# Patient Record
Sex: MALE | Race: WHITE | NOT HISPANIC OR LATINO | URBAN - METROPOLITAN AREA
[De-identification: names, ages, dates, MRNs, and addresses within clinical notes are randomized per-mention and may not be internally consistent; named-entity substitution may affect disease eponyms.]

---

## 2022-01-19 ENCOUNTER — INPATIENT (INPATIENT)
Facility: HOSPITAL | Age: 42
LOS: 0 days | Discharge: HOME | End: 2022-01-20
Attending: HOSPITALIST | Admitting: HOSPITALIST
Payer: COMMERCIAL

## 2022-01-19 VITALS
OXYGEN SATURATION: 100 % | DIASTOLIC BLOOD PRESSURE: 90 MMHG | RESPIRATION RATE: 20 BRPM | TEMPERATURE: 98 F | SYSTOLIC BLOOD PRESSURE: 161 MMHG | HEART RATE: 97 BPM

## 2022-01-19 LAB
ALBUMIN SERPL ELPH-MCNC: 4.5 G/DL — SIGNIFICANT CHANGE UP (ref 3.5–5.2)
ALP SERPL-CCNC: 92 U/L — SIGNIFICANT CHANGE UP (ref 30–115)
ALT FLD-CCNC: 55 U/L — HIGH (ref 0–41)
ANION GAP SERPL CALC-SCNC: 16 MMOL/L — HIGH (ref 7–14)
AST SERPL-CCNC: 28 U/L — SIGNIFICANT CHANGE UP (ref 0–41)
BASOPHILS # BLD AUTO: 0.05 K/UL — SIGNIFICANT CHANGE UP (ref 0–0.2)
BASOPHILS NFR BLD AUTO: 0.7 % — SIGNIFICANT CHANGE UP (ref 0–1)
BILIRUB SERPL-MCNC: 0.3 MG/DL — SIGNIFICANT CHANGE UP (ref 0.2–1.2)
BUN SERPL-MCNC: 15 MG/DL — SIGNIFICANT CHANGE UP (ref 10–20)
CALCIUM SERPL-MCNC: 9.4 MG/DL — SIGNIFICANT CHANGE UP (ref 8.5–10.1)
CHLORIDE SERPL-SCNC: 101 MMOL/L — SIGNIFICANT CHANGE UP (ref 98–110)
CO2 SERPL-SCNC: 21 MMOL/L — SIGNIFICANT CHANGE UP (ref 17–32)
CREAT SERPL-MCNC: 0.9 MG/DL — SIGNIFICANT CHANGE UP (ref 0.7–1.5)
EOSINOPHIL # BLD AUTO: 0.13 K/UL — SIGNIFICANT CHANGE UP (ref 0–0.7)
EOSINOPHIL NFR BLD AUTO: 1.8 % — SIGNIFICANT CHANGE UP (ref 0–8)
GLUCOSE SERPL-MCNC: 89 MG/DL — SIGNIFICANT CHANGE UP (ref 70–99)
HCT VFR BLD CALC: 46.2 % — SIGNIFICANT CHANGE UP (ref 42–52)
HGB BLD-MCNC: 15.8 G/DL — SIGNIFICANT CHANGE UP (ref 14–18)
IMM GRANULOCYTES NFR BLD AUTO: 1.1 % — HIGH (ref 0.1–0.3)
LYMPHOCYTES # BLD AUTO: 2.12 K/UL — SIGNIFICANT CHANGE UP (ref 1.2–3.4)
LYMPHOCYTES # BLD AUTO: 29.2 % — SIGNIFICANT CHANGE UP (ref 20.5–51.1)
MCHC RBC-ENTMCNC: 30.6 PG — SIGNIFICANT CHANGE UP (ref 27–31)
MCHC RBC-ENTMCNC: 34.2 G/DL — SIGNIFICANT CHANGE UP (ref 32–37)
MCV RBC AUTO: 89.5 FL — SIGNIFICANT CHANGE UP (ref 80–94)
MONOCYTES # BLD AUTO: 0.66 K/UL — HIGH (ref 0.1–0.6)
MONOCYTES NFR BLD AUTO: 9.1 % — SIGNIFICANT CHANGE UP (ref 1.7–9.3)
NEUTROPHILS # BLD AUTO: 4.23 K/UL — SIGNIFICANT CHANGE UP (ref 1.4–6.5)
NEUTROPHILS NFR BLD AUTO: 58.1 % — SIGNIFICANT CHANGE UP (ref 42.2–75.2)
NRBC # BLD: 0 /100 WBCS — SIGNIFICANT CHANGE UP (ref 0–0)
PLATELET # BLD AUTO: 270 K/UL — SIGNIFICANT CHANGE UP (ref 130–400)
POTASSIUM SERPL-MCNC: 4.2 MMOL/L — SIGNIFICANT CHANGE UP (ref 3.5–5)
POTASSIUM SERPL-SCNC: 4.2 MMOL/L — SIGNIFICANT CHANGE UP (ref 3.5–5)
PROT SERPL-MCNC: 7.5 G/DL — SIGNIFICANT CHANGE UP (ref 6–8)
RBC # BLD: 5.16 M/UL — SIGNIFICANT CHANGE UP (ref 4.7–6.1)
RBC # FLD: 12 % — SIGNIFICANT CHANGE UP (ref 11.5–14.5)
SARS-COV-2 RNA SPEC QL NAA+PROBE: SIGNIFICANT CHANGE UP
SODIUM SERPL-SCNC: 138 MMOL/L — SIGNIFICANT CHANGE UP (ref 135–146)
WBC # BLD: 7.27 K/UL — SIGNIFICANT CHANGE UP (ref 4.8–10.8)
WBC # FLD AUTO: 7.27 K/UL — SIGNIFICANT CHANGE UP (ref 4.8–10.8)

## 2022-01-19 PROCEDURE — 99285 EMERGENCY DEPT VISIT HI MDM: CPT

## 2022-01-19 PROCEDURE — 99243 OFF/OP CNSLTJ NEW/EST LOW 30: CPT

## 2022-01-19 RX ORDER — SODIUM CHLORIDE 9 MG/ML
1000 INJECTION, SOLUTION INTRAVENOUS ONCE
Refills: 0 | Status: COMPLETED | OUTPATIENT
Start: 2022-01-19 | End: 2022-01-19

## 2022-01-19 RX ORDER — KETOROLAC TROMETHAMINE 30 MG/ML
30 SYRINGE (ML) INJECTION ONCE
Refills: 0 | Status: DISCONTINUED | OUTPATIENT
Start: 2022-01-19 | End: 2022-01-19

## 2022-01-19 RX ADMIN — Medication 30 MILLIGRAM(S): at 21:56

## 2022-01-19 RX ADMIN — Medication 30 MILLIGRAM(S): at 22:37

## 2022-01-19 RX ADMIN — SODIUM CHLORIDE 1000 MILLILITER(S): 9 INJECTION, SOLUTION INTRAVENOUS at 21:56

## 2022-01-19 NOTE — H&P ADULT - NSHPPHYSICALEXAM_GEN_ALL_CORE
PHYSICAL EXAM:    General: Not in distress.   Skin: Erythematous maculopapular rash on b/l palms and soles, with no blisters or open sores. No swelling, drainage or active bleeding. Mouth: mildly raised sores, healed Patient agrees sores healed. Buttocks: healed wound to the bilateral buttocks.    Cardio: Regular rate and rhythm. S1, S2 appreciated. no murmurs.  Pulm: Bilateral breath sounds. No wheezing, or rhonchi  Abdomen: Soft, non-tender, non-distended.   Extremities: No edema  Neuro: AAOx3

## 2022-01-19 NOTE — H&P ADULT - ASSESSMENT
40 y/o M with no significant PMHx who presents to the ED with a chief complaint of rash to the b/l palms and soles and sores to mouth.     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 Blistering mouth sores  - evaluated by burn, no intervention  - c/w mupirocin  - c/w prednisone 40 mg daily  - c/w benadryl 25 mg PRN  - reconsult burn if rash continues to develop or open up  - Derm consult    # Misc  DVT ppx: lovenox  GI ppx: protonix  Diet: Regular  Activity: IAT  Code status: Full  Dispo: admit to medicine 40 y/o M with no significant PMHx who presents to the ED with a chief complaint of rash to the b/l palms and soles and sores to mouth.     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)  hemodynamically stable  skin intact, no evid for TENS  only lips involved (mouth, nares, eyes and penis not involved) - there was perianal involvement, but this is subsiding  evaluated by burn, no intervention  mupirocin not needed, unless skin ulcerates (so far, skin intact)  abx not needed for this (or for diverticular dz)  no cyclosporine at this juncture  make prednisone 70 mg (0.5mg/kg) daily for 1 week (can stop around 5-7 days w/out taper) (steroids seem to be helping him)  make benadryl 50 mg po q6 PRN  can use tylenol or advil as pain adjunct prn OTC  pt can f/u w/ PMD as outpt  Burn f/u not needed unless skin ulcerates  if rash not clearing well after 1wk or so, then derm consult    # I offered pt testing (for completion sake) for HIV, RPR, HSV, HEP A,B,C, but he refused  pt is  to woman for 20 yrs;     # DVT ppx: lovenox    # Activity: Independent    # Code status: Full    Dispo: d/c home today

## 2022-01-19 NOTE — ED PROVIDER NOTE - PHYSICAL EXAMINATION
Physical Exam    Vital Signs: I have reviewed the initial vital signs.  Constitutional: well-nourished, appears stated age, no acute distress  Eyes: Conjunctiva pink, Sclera clear, PERRLA, EOMI without pain.  ENT: pt has ulcerations to the inner lower buccal region.  uvula midline. no tonsillar erythema, edema, or exudates. no stridor. no pta. floor of the mouth is soft without pus.   Cardiovascular: S1 and S2, regular rate, regular rhythm, well-perfused extremities, radial pulses equal and 2+ b/l.   Respiratory: unlabored respiratory effort, clear to auscultation bilaterally no wheezing, rales and rhonchi. pt is speaking full sentences. no accessory muscle use.   Gastrointestinal: soft, non-tender, nondistended abdomen, no pulsatile mass, normal bowl sounds, no rebound, no guarding  Genitourinary: exam chaperoned by Dr. Hernandez. no testicular erythema, edema, or tenderness. no sloughing of the skin in the genital region.   Musculoskeletal: supple neck, no lower extremity edema, no calf tenderness, FROM of b/l upper and lower extremities.   Integumentary: warm, dry, (+) b/l erythema to the b/l palms and soles with erythematous flat macules to the dorsal aspect of b/l hands and feel. (+) pt has a erythema to the inner buttocks b/l with a left inner buttocks healing ulceration without drainage.   Neurologic: awake, alert, cranial nerves II-XII grossly intact, extremities’ motor and sensory functions grossly intact. steady gait.   Psychiatric: appropriate mood, appropriate affect

## 2022-01-19 NOTE — CONSULT NOTE ADULT - ASSESSMENT
Assessment: 41y old Male with no pmhx who presented with a chief complaint of rash to the b/l palms and soles and sores to mouth. Burn consulted for evaluation.    Plan:   -No intervention at this time. No open sores. Continue mupirocin as needed.   - If rash continues to develop, or open up, recall burn for skin biopsy. 41y old Male with no pmhx who presented with a chief complaint of rash to the b/l palms and soles and sores to mouth. Burn consulted for evaluation.    Palm and sole rash with mucosal involvement: r/o SJS  - No surgical intervention at this time. No open sores can continue mupirocin as needed.   - Patient recommended to stay for a few more hours for observation to see progression of rash. Patient agreed with plan.   - Patient educated on warning signs and symptoms.   - Upon dispo patient will followup with Dermatology outpatient   - If rash open up, recall for skin biopsy.

## 2022-01-19 NOTE — ED PROVIDER NOTE - OBJECTIVE STATEMENT
42 y/o male with no significant PMH presents to the ED for evaluation of mouth sores x 1 week, associated with burning itching rash to the b/l hands and feet x 2 days. pt reports over a week ago he was diagnosed with diverticulitis and started on bactrim and flagyl. pt reports he completed both abx. pt reports while he was taking the abx he developed mouth sore and throat tightness. pt was seen at [facility] and prescibed prednisone for the throat tightness which resolved. pt reports b/l hand and feet rash developed two days ago. pt was seen by pcp dr. florentino and prescribed valtrex for mouth sores. pt reports testicular burning and feels his testicles are raw. pt denies fever, chills, sore throat, chest pain, sob, weakness, or hx of allergies.

## 2022-01-19 NOTE — CONSULT NOTE ADULT - ATTENDING COMMENTS
As above   Chart reviewed. History of illness reviewed with pt and wife   Pt developed parasthesias of hands and feet , rash and oral lesions after beginning Bactrim and Flagyl po for diverticulitis.   Has had antiviral and steroids to treat sxs;  abx discontinued  Denies fever, eye sxs or genital sxs  Reports tightness and parasthesias of hand improved and tongue and lip swelling seem resolved today                         15.8   7.27  )-----------( 270      ( 19 Jan 2022 22:05 )             46.2     EXAM:  Pt awake alert and with appropriate responses to questions   Eyes - sclera clear   Oral - ulcers right commissure, labial and buccal mucosa - no bleeding   b/l hands and feet -dorsum - erythematous maculopapular rash ; no blistering, weeping or open wounds   erythema and sl tenderness/ sensitivity to touch  - palmar and plantar surfaces     erythema of upper chest and back - no change from pt's usual skin condition     A/P -  Pt with rash - likely due to Bactrim   signs and sxs are c/w with SJS though seem to have stabilized   Discussed continued monitoring/ management and skin biopsy as possibly diagnostic tool with pt and wife   They opt to hold off on skin biopsy for now   Concerns addressed   Team discussed above with medical attending

## 2022-01-19 NOTE — ED ADULT NURSE NOTE - TEMPLATE LIST FOR HEAD TO TOE ASSESSMENT
Call to patient's wife, updated as to completion of infusion and ECHO   Physician will contact with results General

## 2022-01-19 NOTE — ED ADULT NURSE NOTE - OBJECTIVE STATEMENT
Pt with C/O B/L hand and feet pins and needless skin redness swollen, mouth sore rash throat pain  after was given Flagyl, ,Sulfa antibiotic for DX- Diverticulitis ,pt state he took antibiotics  for 6 days went to [facility]  and was given Benadryl and prednisone or 4 days ,pt still complaining still symptoms on going  not going way .

## 2022-01-19 NOTE — CONSULT NOTE ADULT - SUBJECTIVE AND OBJECTIVE BOX
HPI: Patient is a 41y old Male with no pmhx who presents with a chief complaint of rash to the b/l palms and soles and sores to mouth. Patient states sores to his mouth presented about 1 week ago, from when patient was started on Bactrim and flagyl, after being diagnosed with diverticulitis in the ED 1 week ago. Patient states he initially felt a rash on his buttocks and throat soreness. He went to the Urgent care, where he was treated with prednisone. Rash to b/l palms and soles presented two days ago. Patient states he feels pain and tingling to affected areas, and the rash progressively spreading. He self treated mouth sores with oral B mouthwash and rash with neosporin ointment. Yesterday, he went to his PCP where he was treated with valcyclovir and rash with mupirocin.     Burn was consulted for evaluation of rash and sores.     PAST MEDICAL & SURGICAL HISTORY:    Allergies    penicillin (Unknown)      PHYSICAL EXAM    Vital Signs Last 24 Hrs  T(C): 36.8 (19 Jan 2022 18:44), Max: 36.8 (19 Jan 2022 18:44)  T(F): 98.2 (19 Jan 2022 18:44), Max: 98.2 (19 Jan 2022 18:44)  HR: 97 (19 Jan 2022 18:44) (97 - 97)  RR: 20 (19 Jan 2022 18:44) (20 - 20)  SpO2: 100% (19 Jan 2022 18:44) (100% - 100%)      PHYSICAL EXAM:  GENERAL: Sitting on exam table, in NAD.   Wound: Erythematous maculopapular rash on b/l palms and soles, with no blisters or open sores. No swelling, drainage or active bleeding. Mouth: mildly raised sores, healed Patient agrees sores healed. Buttocks: healed wound to the bilateral buttocks.           HPI: Patient is a 41y old Male with no pmhx who presents with a chief complaint of rash to the b/l palms and soles and sores to mouth. Patient states sores to his mouth presented about 1 week ago, from when patient was started on Bactrim and flagyl, after being diagnosed with diverticulitis in the ED 1 week ago. Patient states he initially felt a rash on his buttocks and throat soreness. He went to the Urgent care, where he was treated with prednisone. Rash to b/l palms and soles presented two days ago. Patient states he feels pain and tingling to affected areas, and the rash progressively spreading. He self treated mouth sores with oral B mouthwash and rash with neosporin ointment. Yesterday, he went to his PCP where he was treated with valcyclovir and rash with mupirocin.     Burn was consulted for evaluation of rash and sores.     PAST MEDICAL & SURGICAL HISTORY:    Allergies  - Penicillin (Unknown)    Vital Signs Last 24 Hrs  T(C): 36.8 (19 Jan 2022 18:44), Max: 36.8 (19 Jan 2022 18:44)  T(F): 98.2 (19 Jan 2022 18:44), Max: 98.2 (19 Jan 2022 18:44)  HR: 97 (19 Jan 2022 18:44) (97 - 97)  RR: 20 (19 Jan 2022 18:44) (20 - 20)  SpO2: 100% (19 Jan 2022 18:44) (100% - 100%)    PHYSICAL EXAM:  GENERAL: Sitting on exam table, in NAD.   HEENT: multiple white non-open lesions on buccal mucosa. No active bleeding or drainage noted.   Wound: Erythematous maculopapular rash on b/l palms and soles, with no blisters or open sores. No swelling, drainage or active bleeding. Mouth: mildly raised sores, healed Patient agrees sores healed. Buttocks: healed wound to the bilateral buttocks.

## 2022-01-19 NOTE — ED PROVIDER NOTE - NS ED ROS FT
CONST: No fever, chills or bodyaches  EYES: No pain, redness, drainage or visual changes.  ENT: No ear pain or discharge, nasal discharge or congestion. No sore throat. (+) mouth sores.   CARD: No chest pain, palpitations  RESP: No SOB, cough, hemoptysis. No hx of asthma or COPD  GI: No abdominal pain, N/V/D  : No urinary symptoms  MS: No joint pain, back pain or extremity pain/injury  SKIN: (+) b/l hand and feet rash.   NEURO: No headache, dizziness, paresthesias or LOC

## 2022-01-19 NOTE — ED PROVIDER NOTE - ATTENDING CONTRIBUTION TO CARE
40 y/o M with no significant PMHx who presents to the ED with a chief complaint of rash to the b/l palms and soles and sores to mouth.  Pt had diverticulitis a week ago for which he was started on Bactrim and Flagyl. Pt then developed painful blisters on his buttocks, throat soreness, and his tongue and lips became swollen.  He visited a local C and was prescribed steroids for 4 days. Symptoms improved with steroids. Pt continued to take Abx for 6 days despite rash and stopped it when he developed painful mouth sores. He self medicated with mouthwash.  About 2 days ago, he developed rash on b/l palms and soles, rash gives a tingling sensation, he used neosporin ointment without relief.  He visited his PCP yesterday and was prescribed valacyclovir and mupirocin.  His pain has worsened to ED which prompted ED visit. He denies any shortness of breath, dizziness, nausea, vomiting.  PE:  agree with above.  A/P:  Erythema Multiforme/Valdez Hamzah Syndrome.  Labs, Meds, Imaging, Burn Consult.  IVF and ADMIT.

## 2022-01-19 NOTE — H&P ADULT - HISTORY OF PRESENT ILLNESS
40 y/o M with no significant PMH presents to the ED for evaluation of rash and mouth sores x 1 week.  associated with burning itching rash to the b/l hands and feet x 2 days. pt reports over a week ago he was diagnosed with diverticulitis and started on bactrim and flagyl. pt reports he completed both abx. pt reports while he was taking the abx he developed mouth sore and throat tightness. pt was seen at [facility] and prescibed prednisone for the throat tightness which resolved. pt reports b/l hand and feet rash developed two days ago. pt was seen by pcp dr. florentino and prescribed valtrex for mouth sores. pt reports testicular burning and feels his testicles are raw. pt denies fever, chills, sore throat, chest pain, sob, weakness, or hx of allergies.   42 y/o M with no significant PMHx who presents to the ED with a chief complaint of rash to the b/l palms and soles and sores to mouth.   Pt had diverticulitis a week ago for which he was started on bactrim and flagyl. Pt then developed painful blisters on his buttocks, he felt throat soreness, his tongue and lips became swollen, he visited  and was prescribed steroids for 4 days. Symptoms improved with steroids. Pt continued to take Abx for 6 days despite rash and stopped it when he developed painful mouth sores. He self medicated with mouthwash.  About 2 days ago, he developed rash on b/l palms and soles, rash gives a tingling sensation, he used neosporin ointment without relief.  He visited his PCP yesterday and was prescribed valacyclovir and mupirocin.  His pain has worsened to ED which prompted ED visit. He denies any shortness of breath, dizziness, nausea, vomiting.      In the ED, /90, HR 97, temp 98.2 and spO2 100% on RA.      40 y/o M with no significant PMHx who presents to the ED with a chief complaint of rash to the b/l palms and soles and sores to mouth.   Pt had diverticulitis a week ago for which he was started on bactrim and flagyl. Pt then developed painful blisters on his buttocks, he felt throat soreness, his tongue and lips became swollen, he visited  and was prescribed steroids for 4 days. Symptoms improved with steroids. Pt continued to take Abx for 6 days despite rash and stopped it when he developed painful mouth sores. He self medicated with mouthwash.  About 2 days ago, he developed rash on b/l palms and soles, rash gives a tingling sensation, he used neosporin ointment without relief.  He visited his PCP yesterday and was prescribed valacyclovir and mupirocin.  His pain has worsened to ED which prompted ED visit. He denies any shortness of breath, dizziness, nausea, vomiting.    In the ED, /90, HR 97, temp 98.2 and spO2 100% on RA.     Attd: seems pt has sensitive skin to start w/ as he has blotchy rash on chest and back for yrs (prob exczema, does NOT look like psoriasis); this rash has never had symptoms (no pain or itch); pt was placed on bactrim/flagyl recently for diverticulitis; he developed painful ulcers around anus and eventually lips; he also has swelling of hands and feet and blotchy erthema on dorsum of hands; no hives seen; hands seem swollen and are a little difficult for him to close; hands and feet were itchy and a little tender; pt had taper of steroids, but this failed w/ tapering; pt feeling better w/ solumedrol and wants to go home today; pt can swallow; he has no penile lesions; all skin is intact; no target lesions seen; lips show shallow ulcers, no vesicles; there is no necrosis; nares and eyes are spared;

## 2022-01-20 VITALS
TEMPERATURE: 98 F | DIASTOLIC BLOOD PRESSURE: 67 MMHG | SYSTOLIC BLOOD PRESSURE: 147 MMHG | HEART RATE: 104 BPM | RESPIRATION RATE: 18 BRPM

## 2022-01-20 DIAGNOSIS — K57.92 DIVERTICULITIS OF INTESTINE, PART UNSPECIFIED, WITHOUT PERFORATION OR ABSCESS WITHOUT BLEEDING: ICD-10-CM

## 2022-01-20 PROCEDURE — 99223 1ST HOSP IP/OBS HIGH 75: CPT

## 2022-01-20 RX ORDER — DIPHENHYDRAMINE HCL 50 MG
50 CAPSULE ORAL EVERY 6 HOURS
Refills: 0 | Status: DISCONTINUED | OUTPATIENT
Start: 2022-01-20 | End: 2022-01-20

## 2022-01-20 RX ORDER — DIPHENHYDRAMINE HYDROCHLORIDE AND LIDOCAINE HYDROCHLORIDE AND ALUMINUM HYDROXIDE AND MAGNESIUM HYDRO
15 KIT THREE TIMES A DAY
Refills: 0 | Status: DISCONTINUED | OUTPATIENT
Start: 2022-01-20 | End: 2022-01-20

## 2022-01-20 RX ORDER — DIPHENHYDRAMINE HCL 50 MG
1 CAPSULE ORAL
Qty: 28 | Refills: 0
Start: 2022-01-20 | End: 2022-01-26

## 2022-01-20 RX ORDER — MUPIROCIN 20 MG/G
1 OINTMENT TOPICAL EVERY 8 HOURS
Refills: 0 | Status: DISCONTINUED | OUTPATIENT
Start: 2022-01-20 | End: 2022-01-20

## 2022-01-20 RX ORDER — PANTOPRAZOLE SODIUM 20 MG/1
40 TABLET, DELAYED RELEASE ORAL
Refills: 0 | Status: DISCONTINUED | OUTPATIENT
Start: 2022-01-20 | End: 2022-01-20

## 2022-01-20 RX ORDER — CHLORHEXIDINE GLUCONATE 213 G/1000ML
1 SOLUTION TOPICAL DAILY
Refills: 0 | Status: DISCONTINUED | OUTPATIENT
Start: 2022-01-20 | End: 2022-01-20

## 2022-01-20 RX ORDER — INFLUENZA VIRUS VACCINE 15; 15; 15; 15 UG/.5ML; UG/.5ML; UG/.5ML; UG/.5ML
0.5 SUSPENSION INTRAMUSCULAR ONCE
Refills: 0 | Status: COMPLETED | OUTPATIENT
Start: 2022-01-20 | End: 2022-01-20

## 2022-01-20 RX ORDER — ENOXAPARIN SODIUM 100 MG/ML
40 INJECTION SUBCUTANEOUS DAILY
Refills: 0 | Status: DISCONTINUED | OUTPATIENT
Start: 2022-01-20 | End: 2022-01-20

## 2022-01-20 RX ADMIN — Medication 50 MILLIGRAM(S): at 00:59

## 2022-01-20 RX ADMIN — Medication 60 MILLIGRAM(S): at 05:35

## 2022-01-20 RX ADMIN — PANTOPRAZOLE SODIUM 40 MILLIGRAM(S): 20 TABLET, DELAYED RELEASE ORAL at 05:35

## 2022-01-20 RX ADMIN — DIPHENHYDRAMINE HYDROCHLORIDE AND LIDOCAINE HYDROCHLORIDE AND ALUMINUM HYDROXIDE AND MAGNESIUM HYDRO 15 MILLILITER(S): KIT at 05:34

## 2022-01-20 RX ADMIN — MUPIROCIN 1 APPLICATION(S): 20 OINTMENT TOPICAL at 05:35

## 2022-01-20 NOTE — DISCHARGE NOTE PROVIDER - NSDCCPCAREPLAN_GEN_ALL_CORE_FT
PRINCIPAL DISCHARGE DIAGNOSIS  Diagnosis: Erythema multiforme bullosum (Valdez Hamzah syndrome)  Assessment and Plan of Treatment:        PRINCIPAL DISCHARGE DIAGNOSIS  Diagnosis: Erythema multiforme bullosum (Valdez Hamzah syndrome)  Assessment and Plan of Treatment: You recently use the antibiotic bactrim for a diverticulitis flare. While using bactrim you developed skin and oral rash. You are diagnosed with Justin Hamzah Syndrome.  Please follow up with Dr. Gill within 1 week.  If your rash, oral lesions worsen please return to the ED.  If your start to experience sluffing of your skin return to the ED.  Please continue to take prednisone 70mg daily for 7 days. If your rash is improving but not fully resolved contact your doctor about continuing steroids for several more days.  Please avoid the use of bactrim as well as be cautious when taken future medications with sulfur as a component.  WHAT YOU NEED TO KNOW:  Valdez-Hamzah syndrome (SJS) is a rare and serious condition of your skin and mucus membranes. SJS will cause you to lose up to 10% of your outer layer of skin. SJS is usually caused by a response to a medicine you have been taking. The most common medicines are antibiotics, NSAIDs, and antiseizure medicines. The response may happen 1 week to 2 months after you take the medicine. SJS may also be caused by infection, vaccinations, or diseases involving your organs or whole body.  DISCHARGE INSTRUCTIONS:  Alert others of your condition:   •Carry medical alert identification, such as jewelry or a card that says you have had SJS. In case of emergency, this will tell others which medicines have made you sick. Ask your healthcare provider where to get these items.   •Tell close relatives what caused your SJS. They may be at a higher risk for SJS from the same cause.  Contact your healthcare provider if:   •You have a fever.  •You have questions or concerns about your condition or care.  Seek immediate care or call 911 if:   •You feel very dizzy, or you faint.  •You have new changes in your vision, or you suddenly have trouble seeing.  •You have new sores on your body.  •You have pain that is not helped by medicine, or is getting worse.  •You suddenly have trouble breathing.

## 2022-01-20 NOTE — PATIENT PROFILE ADULT - FALL HARM RISK - UNIVERSAL INTERVENTIONS
Bed in lowest position, wheels locked, appropriate side rails in place/Call bell, personal items and telephone in reach/Instruct patient to call for assistance before getting out of bed or chair/Non-slip footwear when patient is out of bed/[place] to call system/Physically safe environment - no spills, clutter or unnecessary equipment/Purposeful Proactive Rounding/Room/bathroom lighting operational, light cord in reach

## 2022-01-20 NOTE — PROGRESS NOTE ADULT - SUBJECTIVE AND OBJECTIVE BOX
SUBJECTIVE:    Patient is a 41y old Male who presents with a chief complaint of   Currently admitted to medicine with the primary diagnosis of Erythema multiforme bullosum (Valdez Hamzah syndrome)       Today is hospital day 1d. This morning he is resting comfortably in bed and reports no new issues or overnight events. Pt reports stable B/L hand and sole erythematous and edematous erythema.       PAST MEDICAL & SURGICAL HISTORY    SOCIAL HISTORY:    ALLERGIES:  penicillin (Unknown)    MEDICATIONS:  STANDING MEDICATIONS  chlorhexidine 4% Liquid 1 Application(s) Topical daily  enoxaparin Injectable 40 milliGRAM(s) SubCutaneous daily  FIRST- Mouthwash  BLM 15 milliLiter(s) Swish and Spit three times a day  influenza   Vaccine 0.5 milliLiter(s) IntraMuscular once  methylPREDNISolone sodium succinate Injectable 60 milliGRAM(s) IV Push daily  mupirocin 2% Ointment 1 Application(s) Topical every 8 hours  pantoprazole    Tablet 40 milliGRAM(s) Oral before breakfast    PRN MEDICATIONS  diphenhydrAMINE 50 milliGRAM(s) Oral every 6 hours PRN    VITALS:   T(F): 97  HR: 80  BP: 112/66  RR: 18  SpO2: 96%    LABS:  Negative for smoking/alcohol/drug use.                         15.8   7.27  )-----------( 270      ( 19 Jan 2022 22:05 )             46.2     01-19    138  |  101  |  15  ----------------------------<  89  4.2   |  21  |  0.9    Ca    9.4      19 Jan 2022 22:05    TPro  7.5  /  Alb  4.5  /  TBili  0.3  /  DBili  x   /  AST  28  /  ALT  55<H>  /  AlkPhos  92  01-19        RADIOLOGY:  No imaging this admission.       PHYSICAL EXAM:  GEN: No acute distress. Pt was seen and examined at bedside.   HEENT: + corn of lip healing ulcerated, erythematous lesions. + oral lesions of buccal mucosa and mucosa anterior to teeth, erythematous, healing ulcerative lesions. Facial erythema, appears coalesced raised lesions. Tongue slight erythema. Posterior pharnyx minimal erythema, no lesions noted. normocephalic, atraumatic, aniceteric  LUNGS: Clear to auscultation bilaterally, no rales/wheezing/ rhonchi  HEART: S1/S2 present. RRR, no murmurs  ABD: Soft, non-tender, non-distended. Bowel sounds present  EXT: No LE pitting edema, skin findings as below.   NEURO: Alert and awake, normal affect  SKIN:  B/L hand dorsum and palm erythematous coalesced lesions, noted to be stable since admission. B/L feet sole erythema and edema. No pruritis. Chest and upper arm maculopapular erythematous lesions noted to be stable and chronic per pt.       ASSESSMENT AND PLAN:  42 y/o M with PMHx of diverticulitis who presents to the ED with a chief complaint of rash to the b/l palms and soles and sores to mouth.     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 Blistering mouth sores  - evaluated by burn, no intervention  - c/w mupirocin  - c/w prednisone 40 mg daily  - c/w benadryl 25 mg PRN  - reconsult burn if rash continues to develop or open up  - Derm consult    # Misc  DVT ppx: lovenox  GI ppx: protonix  Diet: Regular  Activity: IAT  Code status: Full  Dispo: admit to medicine         SUBJECTIVE:    Patient is a 41y old Male who presents with a chief complaint of   Currently admitted to medicine with the primary diagnosis of Erythema multiforme bullosum (Valdez Hamzah syndrome)       Today is hospital day 1d. This morning he is resting comfortably in bed and reports no new issues or overnight events. Pt reports stable B/L hand and sole erythematous and edematous erythema.       PAST MEDICAL & SURGICAL HISTORY    SOCIAL HISTORY:    ALLERGIES:  penicillin (Unknown)    MEDICATIONS:  STANDING MEDICATIONS  chlorhexidine 4% Liquid 1 Application(s) Topical daily  enoxaparin Injectable 40 milliGRAM(s) SubCutaneous daily  FIRST- Mouthwash  BLM 15 milliLiter(s) Swish and Spit three times a day  influenza   Vaccine 0.5 milliLiter(s) IntraMuscular once  methylPREDNISolone sodium succinate Injectable 60 milliGRAM(s) IV Push daily  mupirocin 2% Ointment 1 Application(s) Topical every 8 hours  pantoprazole    Tablet 40 milliGRAM(s) Oral before breakfast    PRN MEDICATIONS  diphenhydrAMINE 50 milliGRAM(s) Oral every 6 hours PRN    VITALS:   T(F): 97  HR: 80  BP: 112/66  RR: 18  SpO2: 96%    LABS:  Negative for smoking/alcohol/drug use.                         15.8   7.27  )-----------( 270      ( 19 Jan 2022 22:05 )             46.2     01-19    138  |  101  |  15  ----------------------------<  89  4.2   |  21  |  0.9    Ca    9.4      19 Jan 2022 22:05    TPro  7.5  /  Alb  4.5  /  TBili  0.3  /  DBili  x   /  AST  28  /  ALT  55<H>  /  AlkPhos  92  01-19        RADIOLOGY:  No imaging this admission.       PHYSICAL EXAM:  GEN: No acute distress. Pt was seen and examined at bedside.   HEENT: + corn of lip healing ulcerated, erythematous lesions. + oral lesions of buccal mucosa and mucosa anterior to teeth, erythematous, healing ulcerative lesions. Facial erythema, appears coalesced raised lesions. Tongue slight erythema. Posterior pharnyx minimal erythema, no lesions noted. normocephalic, atraumatic, aniceteric  LUNGS: Clear to auscultation bilaterally, no rales/wheezing/ rhonchi  HEART: S1/S2 present. RRR, no murmurs  ABD: Soft, non-tender, non-distended. Bowel sounds present  EXT: No LE pitting edema, skin findings as below.   NEURO: Alert and awake, normal affect  SKIN:  B/L hand dorsum and palm erythematous coalesced lesions, noted to be stable since admission. B/L feet sole erythema and edema. No pruritis. Chest and upper arm maculopapular erythematous lesions noted to be stable and chronic per pt.       ASSESSMENT AND PLAN:  40 y/o M with PMHx of diverticulitis who presents to the ED with a chief complaint of rash to the b/l palms and soles and sores to mouth.       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 Blistering mouth sores  -Burn consulted:  no intervention, mouth wash/mupirocin --> if worsening rash or blistering/opening of lesions --> recall Burn    -f/u HIV, syphilis, HSV serum 1+2, hep panel  -inquire about sexual history and allergy history (pt and family)  -c/w mupirocin  -c/w methylprednisolone 60mg IV qd --> may switch to   -c/w benadryl 25 mg PRN  -reconsult burn if rash continues to develop or open up  -Derm consult    #Elevated ALT  -ALT: 55    -hepatitis panel    #h/o Diverticulitis, recent flare started on bactrim  -no diarrhea, abdominal pain, or constipation  -no leukocytosis    -monitor for sxs      # Misc  DVT ppx:  lovenox  GI ppx:  protonix 40mg PO qd  Diet:  Soft and bite sized  Activity:  IAT  Code status:  Full    Dispo: admit to medicine, possible discharged pending improvement in rash and no worsening of rash/oral lesions      Provider Handoff: (Pending) - follow up:   -f/u HIV, syphilis, HSV serum 1+2, hep panel  -f/u sexual history and allergy history

## 2022-01-20 NOTE — DISCHARGE NOTE PROVIDER - NSDCFUADDAPPT_GEN_ALL_CORE_FT
Please follow up with Dr. Gill within 1 week.  If your rash, oral lesions worsen please return to the ED.  If your start to experience sluffing of your skin return to the ED.    Please continue to take prednisone 70mg daily for 7 days. If your rash is improving but not fully resolved contact your doctor about continuing steroids for several more days.    Please follow up with your PCP regarding your HIV, hepatitis, HSV, and syphilis results.

## 2022-01-20 NOTE — DISCHARGE NOTE NURSING/CASE MANAGEMENT/SOCIAL WORK - PATIENT PORTAL LINK FT
You can access the FollowMyHealth Patient Portal offered by [facility] by registering at the following website: [email or web address]. By joining [employer]’s FollowMyHealth portal, you will also be able to view your health information using other applications (apps) compatible with our system.

## 2022-01-20 NOTE — DISCHARGE NOTE PROVIDER - CARE PROVIDER_API CALL
Kiel Carrillo (DO)  Infectious Disease; Internal Medicine  [address] [place] [address]  Phone: [phone]  Fax: [phone]  Established Patient  Follow Up Time: 1-3 days

## 2022-01-20 NOTE — DISCHARGE NOTE PROVIDER - NSDCMRMEDTOKEN_GEN_ALL_CORE_FT
diphenhydrAMINE 50 mg oral capsule: 1 cap(s) orally every 6 hours, As needed, Rash and/or Itching  predniSONE 20 mg oral tablet: 1 tab(s) orally once a day take with prednisone 50mg tab to ensure you are taking 70mg of prednisone daily.  predniSONE 50 mg oral tablet: 1 tab(s) orally once a day please take with the prednisone 20mg tab to ensure you take prednisone 70mg daily.

## 2022-01-20 NOTE — DISCHARGE NOTE PROVIDER - HOSPITAL COURSE
40 y/o M with PMHx of diverticulitis who presents to the ED with a chief complaint of rash to the b/l palms and soles and sores to mouth.   Pt had diverticulitis a week ago for which he was started on bactrim and flagyl. Pt then developed painful blisters on his buttocks, he felt throat soreness, his tongue and lips became swollen, he visited UC and was prescribed steroids for 4 days. Symptoms improved with steroids. Pt continued to take Abx for 6 days despite rash and stopped it when he developed painful mouth sores. He self medicated with mouthwash.  About 2 days ago, he developed rash on b/l palms and soles, rash gives a tingling sensation, he used neosporin ointment without relief.  He visited his PCP yesterday and was prescribed valacyclovir and mupirocin.  His pain has worsened to ED which prompted ED visit. He denies any shortness of breath, dizziness, nausea, vomiting.      In the ED, /90, HR 97, temp 98.2 and spO2 100% on RA.         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 Blistering mouth sores  -Burn consulted:  no intervention, mouth wash/mupirocin --> if worsening rash or blistering/opening of lesions --> recall Burn    -f/u HIV, syphilis, HSV serum 1+2, hep panel  -inquire about sexual history and allergy history (pt and family)  -c/w mupirocin  -c/w methylprednisolone 60mg IV qd --> may switch to   -c/w benadryl 25 mg PRN  -reconsult burn if rash continues to develop or open up  -Derm consult    #Elevated ALT  -ALT: 55    -hepatitis panel    #h/o Diverticulitis, recent flare started on bactrim  -no diarrhea, abdominal pain, or constipation  -no leukocytosis    -monitor for sxs      # Misc  DVT ppx:  lovenox  GI ppx:  protonix 40mg PO qd  Diet:  Soft and bite sized  Activity:  IAT  Code status:  Full    Dispo: admit to medicine, possible discharged pending improvement in rash and no worsening of rash/oral lesions      On discharged:  pt to be discharged on prednisone 70mg PO qd x 7 days, if pt still has rash --> continue prednisone;  Benadryl 50mg q6hr PRN x 7 days, may continue as needed. Pt declined hep, HIV, HSV 1+2, and syphilis workup, pt agree to follow up with primary doctor.     42 y/o M with PMHx of diverticulitis who presents to the ED with a chief complaint of rash to the b/l palms and soles and sores to mouth.   Pt had diverticulitis a week ago for which he was started on bactrim and flagyl. Pt then developed painful blisters on his buttocks, he felt throat soreness, his tongue and lips became swollen, he visited UC and was prescribed steroids for 4 days. Symptoms improved with steroids. Pt continued to take Abx for 6 days despite rash and stopped it when he developed painful mouth sores. He self medicated with mouthwash.  About 2 days ago, he developed rash on b/l palms and soles, rash gives a tingling sensation, he used neosporin ointment without relief.  He visited his PCP yesterday and was prescribed valacyclovir and mupirocin.  His pain has worsened to ED which prompted ED visit. He denies any shortness of breath, dizziness, nausea, vomiting.      In the ED, /90, HR 97, temp 98.2 and spO2 100% on RA.         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 Blistering mouth sores  -Burn consulted:  no intervention, mouth wash/mupirocin --> if worsening rash or blistering/opening of lesions --> recall Burn    -f/u HIV, syphilis, HSV serum 1+2, hep panel  -inquire about sexual history and allergy history (pt and family)  -c/w mupirocin  -c/w methylprednisolone 60mg IV qd --> on discharge pt to take prednisone 70mg qd x 7 days and to contact PCP if needed for further abx. If rash or oral lesions worsen pt instructed to return to ED.  -c/w benadryl 25 mg PRN --> on discharge benadryl 50mg PO q6hr PRN x 7 days, may extend for several more days if needed.  -reconsult burn if rash continues to develop or open up  -Derm consult    #Elevated ALT  -ALT: 55    -hepatitis panel    #h/o Diverticulitis, recent flare started on bactrim  -no diarrhea, abdominal pain, or constipation  -no leukocytosis    -monitor for sxs    Dispo: admit to medicine, possible discharged pending improvement in rash and no worsening of rash/oral lesions      On discharged:  pt to be discharged on prednisone 70mg PO qd x 7 days, if pt still has rash --> continue prednisone;  Benadryl 50mg q6hr PRN x 7 days, may continue as needed. hep, HIV, HSV 1+2, and syphilis workup sent and pt agree to follow up with primary doctor after discharge. Med rec reviewed and discussed with primary medical attending.

## 2022-01-21 LAB
HAV IGM SER-ACNC: SIGNIFICANT CHANGE UP
HBV CORE IGM SER-ACNC: SIGNIFICANT CHANGE UP
HBV SURFACE AG SER-ACNC: SIGNIFICANT CHANGE UP
HCV AB S/CO SERPL IA: 0.14 S/CO — SIGNIFICANT CHANGE UP (ref 0–0.99)
HCV AB SERPL-IMP: SIGNIFICANT CHANGE UP
HIV 1+2 AB+HIV1 P24 AG SERPL QL IA: SIGNIFICANT CHANGE UP
HSV1 IGG SER-ACNC: 45 INDEX — HIGH
HSV1 IGG SERPL QL IA: POSITIVE
HSV2 IGG FLD-ACNC: 21.9 INDEX — HIGH
HSV2 IGG SERPL QL IA: POSITIVE
T PALLIDUM AB TITR SER: NEGATIVE — SIGNIFICANT CHANGE UP

## 2022-01-25 DIAGNOSIS — T36.8X5A ADVERSE EFFECT OF OTHER SYSTEMIC ANTIBIOTICS, INITIAL ENCOUNTER: ICD-10-CM

## 2022-01-25 DIAGNOSIS — L49.0: ICD-10-CM

## 2022-01-25 DIAGNOSIS — R21 RASH AND OTHER NONSPECIFIC SKIN ERUPTION: ICD-10-CM

## 2022-01-25 DIAGNOSIS — K12.1 OTHER FORMS OF STOMATITIS: ICD-10-CM

## 2022-01-25 DIAGNOSIS — L51.1 STEVENS-JOHNSON SYNDROME: ICD-10-CM

## 2022-01-25 DIAGNOSIS — Z88.0 ALLERGY STATUS TO PENICILLIN: ICD-10-CM

## 2023-03-23 ENCOUNTER — EMERGENCY (EMERGENCY)
Facility: HOSPITAL | Age: 43
LOS: 0 days | Discharge: HOME | End: 2023-03-23
Attending: EMERGENCY MEDICINE
Payer: COMMERCIAL

## 2023-03-23 VITALS
DIASTOLIC BLOOD PRESSURE: 86 MMHG | TEMPERATURE: 97 F | SYSTOLIC BLOOD PRESSURE: 143 MMHG | OXYGEN SATURATION: 99 % | HEART RATE: 87 BPM | WEIGHT: 274.03 LBS | RESPIRATION RATE: 18 BRPM | HEIGHT: 73 IN

## 2023-03-23 VITALS
DIASTOLIC BLOOD PRESSURE: 80 MMHG | OXYGEN SATURATION: 100 % | RESPIRATION RATE: 16 BRPM | HEART RATE: 80 BPM | SYSTOLIC BLOOD PRESSURE: 132 MMHG

## 2023-03-23 DIAGNOSIS — Z88.2 ALLERGY STATUS TO SULFONAMIDES: ICD-10-CM

## 2023-03-23 DIAGNOSIS — R14.0 ABDOMINAL DISTENSION (GASEOUS): ICD-10-CM

## 2023-03-23 DIAGNOSIS — Z88.0 ALLERGY STATUS TO PENICILLIN: ICD-10-CM

## 2023-03-23 DIAGNOSIS — K57.92 DIVERTICULITIS OF INTESTINE, PART UNSPECIFIED, WITHOUT PERFORATION OR ABSCESS WITHOUT BLEEDING: ICD-10-CM

## 2023-03-23 DIAGNOSIS — R10.32 LEFT LOWER QUADRANT PAIN: ICD-10-CM

## 2023-03-23 LAB
ALBUMIN SERPL ELPH-MCNC: 4.2 G/DL — SIGNIFICANT CHANGE UP (ref 3.5–5.2)
ALP SERPL-CCNC: 85 U/L — SIGNIFICANT CHANGE UP (ref 30–115)
ALT FLD-CCNC: 20 U/L — SIGNIFICANT CHANGE UP (ref 0–41)
ANION GAP SERPL CALC-SCNC: 7 MMOL/L — SIGNIFICANT CHANGE UP (ref 7–14)
APPEARANCE UR: CLEAR — SIGNIFICANT CHANGE UP
AST SERPL-CCNC: 18 U/L — SIGNIFICANT CHANGE UP (ref 0–41)
BASOPHILS # BLD AUTO: 0.03 K/UL — SIGNIFICANT CHANGE UP (ref 0–0.2)
BASOPHILS NFR BLD AUTO: 0.3 % — SIGNIFICANT CHANGE UP (ref 0–1)
BILIRUB SERPL-MCNC: 0.5 MG/DL — SIGNIFICANT CHANGE UP (ref 0.2–1.2)
BILIRUB UR-MCNC: NEGATIVE — SIGNIFICANT CHANGE UP
BUN SERPL-MCNC: 13 MG/DL — SIGNIFICANT CHANGE UP (ref 10–20)
CALCIUM SERPL-MCNC: 8.9 MG/DL — SIGNIFICANT CHANGE UP (ref 8.4–10.5)
CHLORIDE SERPL-SCNC: 100 MMOL/L — SIGNIFICANT CHANGE UP (ref 98–110)
CO2 SERPL-SCNC: 28 MMOL/L — SIGNIFICANT CHANGE UP (ref 17–32)
COLOR SPEC: YELLOW — SIGNIFICANT CHANGE UP
CREAT SERPL-MCNC: 0.7 MG/DL — SIGNIFICANT CHANGE UP (ref 0.7–1.5)
DIFF PNL FLD: ABNORMAL
EGFR: 117 ML/MIN/1.73M2 — SIGNIFICANT CHANGE UP
EOSINOPHIL # BLD AUTO: 0.04 K/UL — SIGNIFICANT CHANGE UP (ref 0–0.7)
EOSINOPHIL NFR BLD AUTO: 0.4 % — SIGNIFICANT CHANGE UP (ref 0–8)
GLUCOSE SERPL-MCNC: 89 MG/DL — SIGNIFICANT CHANGE UP (ref 70–99)
GLUCOSE UR QL: NEGATIVE MG/DL — SIGNIFICANT CHANGE UP
HCT VFR BLD CALC: 42.5 % — SIGNIFICANT CHANGE UP (ref 42–52)
HGB BLD-MCNC: 14.5 G/DL — SIGNIFICANT CHANGE UP (ref 14–18)
IMM GRANULOCYTES NFR BLD AUTO: 0.4 % — HIGH (ref 0.1–0.3)
KETONES UR-MCNC: NEGATIVE — SIGNIFICANT CHANGE UP
LEUKOCYTE ESTERASE UR-ACNC: NEGATIVE — SIGNIFICANT CHANGE UP
LIDOCAIN IGE QN: 19 U/L — SIGNIFICANT CHANGE UP (ref 7–60)
LYMPHOCYTES # BLD AUTO: 2.31 K/UL — SIGNIFICANT CHANGE UP (ref 1.2–3.4)
LYMPHOCYTES # BLD AUTO: 25.1 % — SIGNIFICANT CHANGE UP (ref 20.5–51.1)
MCHC RBC-ENTMCNC: 30.7 PG — SIGNIFICANT CHANGE UP (ref 27–31)
MCHC RBC-ENTMCNC: 34.1 G/DL — SIGNIFICANT CHANGE UP (ref 32–37)
MCV RBC AUTO: 90 FL — SIGNIFICANT CHANGE UP (ref 80–94)
MONOCYTES # BLD AUTO: 0.72 K/UL — HIGH (ref 0.1–0.6)
MONOCYTES NFR BLD AUTO: 7.8 % — SIGNIFICANT CHANGE UP (ref 1.7–9.3)
NEUTROPHILS # BLD AUTO: 6.08 K/UL — SIGNIFICANT CHANGE UP (ref 1.4–6.5)
NEUTROPHILS NFR BLD AUTO: 66 % — SIGNIFICANT CHANGE UP (ref 42.2–75.2)
NITRITE UR-MCNC: NEGATIVE — SIGNIFICANT CHANGE UP
NRBC # BLD: 0 /100 WBCS — SIGNIFICANT CHANGE UP (ref 0–0)
PH UR: 6 — SIGNIFICANT CHANGE UP (ref 5–8)
PLATELET # BLD AUTO: 198 K/UL — SIGNIFICANT CHANGE UP (ref 130–400)
POTASSIUM SERPL-MCNC: 4 MMOL/L — SIGNIFICANT CHANGE UP (ref 3.5–5)
POTASSIUM SERPL-SCNC: 4 MMOL/L — SIGNIFICANT CHANGE UP (ref 3.5–5)
PROT SERPL-MCNC: 6.7 G/DL — SIGNIFICANT CHANGE UP (ref 6–8)
PROT UR-MCNC: NEGATIVE MG/DL — SIGNIFICANT CHANGE UP
RBC # BLD: 4.72 M/UL — SIGNIFICANT CHANGE UP (ref 4.7–6.1)
RBC # FLD: 11.9 % — SIGNIFICANT CHANGE UP (ref 11.5–14.5)
SODIUM SERPL-SCNC: 135 MMOL/L — SIGNIFICANT CHANGE UP (ref 135–146)
SP GR SPEC: 1.02 — SIGNIFICANT CHANGE UP (ref 1.01–1.03)
UROBILINOGEN FLD QL: 0.2 MG/DL — SIGNIFICANT CHANGE UP
WBC # BLD: 9.22 K/UL — SIGNIFICANT CHANGE UP (ref 4.8–10.8)
WBC # FLD AUTO: 9.22 K/UL — SIGNIFICANT CHANGE UP (ref 4.8–10.8)

## 2023-03-23 PROCEDURE — 96375 TX/PRO/DX INJ NEW DRUG ADDON: CPT

## 2023-03-23 PROCEDURE — 87086 URINE CULTURE/COLONY COUNT: CPT

## 2023-03-23 PROCEDURE — 71045 X-RAY EXAM CHEST 1 VIEW: CPT

## 2023-03-23 PROCEDURE — 96374 THER/PROPH/DIAG INJ IV PUSH: CPT

## 2023-03-23 PROCEDURE — 99285 EMERGENCY DEPT VISIT HI MDM: CPT

## 2023-03-23 PROCEDURE — 83690 ASSAY OF LIPASE: CPT

## 2023-03-23 PROCEDURE — 81001 URINALYSIS AUTO W/SCOPE: CPT

## 2023-03-23 PROCEDURE — 36415 COLL VENOUS BLD VENIPUNCTURE: CPT

## 2023-03-23 PROCEDURE — 99284 EMERGENCY DEPT VISIT MOD MDM: CPT | Mod: 25

## 2023-03-23 PROCEDURE — 74177 CT ABD & PELVIS W/CONTRAST: CPT | Mod: 26,MA

## 2023-03-23 PROCEDURE — 74177 CT ABD & PELVIS W/CONTRAST: CPT | Mod: MA

## 2023-03-23 PROCEDURE — 85025 COMPLETE CBC W/AUTO DIFF WBC: CPT

## 2023-03-23 PROCEDURE — 80053 COMPREHEN METABOLIC PANEL: CPT

## 2023-03-23 PROCEDURE — 71045 X-RAY EXAM CHEST 1 VIEW: CPT | Mod: 26

## 2023-03-23 RX ORDER — KETOROLAC TROMETHAMINE 30 MG/ML
15 SYRINGE (ML) INJECTION ONCE
Refills: 0 | Status: DISCONTINUED | OUTPATIENT
Start: 2023-03-23 | End: 2023-03-23

## 2023-03-23 RX ORDER — SODIUM CHLORIDE 9 MG/ML
1000 INJECTION INTRAMUSCULAR; INTRAVENOUS; SUBCUTANEOUS ONCE
Refills: 0 | Status: COMPLETED | OUTPATIENT
Start: 2023-03-23 | End: 2023-03-23

## 2023-03-23 RX ORDER — METRONIDAZOLE 500 MG
1 TABLET ORAL
Qty: 21 | Refills: 0
Start: 2023-03-23 | End: 2023-03-29

## 2023-03-23 RX ORDER — ONDANSETRON 8 MG/1
4 TABLET, FILM COATED ORAL ONCE
Refills: 0 | Status: COMPLETED | OUTPATIENT
Start: 2023-03-23 | End: 2023-03-23

## 2023-03-23 RX ORDER — CIPROFLOXACIN LACTATE 400MG/40ML
1 VIAL (ML) INTRAVENOUS
Qty: 14 | Refills: 0
Start: 2023-03-23 | End: 2023-03-29

## 2023-03-23 RX ORDER — METRONIDAZOLE 500 MG
500 TABLET ORAL ONCE
Refills: 0 | Status: COMPLETED | OUTPATIENT
Start: 2023-03-23 | End: 2023-03-23

## 2023-03-23 RX ORDER — MORPHINE SULFATE 50 MG/1
4 CAPSULE, EXTENDED RELEASE ORAL ONCE
Refills: 0 | Status: DISCONTINUED | OUTPATIENT
Start: 2023-03-23 | End: 2023-03-23

## 2023-03-23 RX ORDER — METRONIDAZOLE 500 MG
1 TABLET ORAL
Qty: 33 | Refills: 0
Start: 2023-03-23 | End: 2023-04-06

## 2023-03-23 RX ORDER — CIPROFLOXACIN LACTATE 400MG/40ML
1 VIAL (ML) INTRAVENOUS
Qty: 22 | Refills: 0
Start: 2023-03-23 | End: 2023-04-06

## 2023-03-23 RX ORDER — CIPROFLOXACIN LACTATE 400MG/40ML
500 VIAL (ML) INTRAVENOUS ONCE
Refills: 0 | Status: COMPLETED | OUTPATIENT
Start: 2023-03-23 | End: 2023-03-23

## 2023-03-23 RX ADMIN — MORPHINE SULFATE 4 MILLIGRAM(S): 50 CAPSULE, EXTENDED RELEASE ORAL at 20:18

## 2023-03-23 RX ADMIN — Medication 500 MILLIGRAM(S): at 22:29

## 2023-03-23 RX ADMIN — SODIUM CHLORIDE 1000 MILLILITER(S): 9 INJECTION INTRAMUSCULAR; INTRAVENOUS; SUBCUTANEOUS at 20:18

## 2023-03-23 RX ADMIN — Medication 15 MILLIGRAM(S): at 21:54

## 2023-03-23 RX ADMIN — Medication 500 MILLIGRAM(S): at 22:30

## 2023-03-23 RX ADMIN — ONDANSETRON 4 MILLIGRAM(S): 8 TABLET, FILM COATED ORAL at 20:17

## 2023-03-23 NOTE — ED PROVIDER NOTE - CARE PROVIDERS DIRECT ADDRESSES
[email or web address]

## 2023-03-23 NOTE — ED PROVIDER NOTE - CARE PROVIDER_API CALL
Jayda Forman (MD)  Gastroenterology  [address] [place] [address]  Phone: [phone]  Fax: [phone]  Follow Up Time: 1-3 Days

## 2023-03-23 NOTE — ED PROVIDER NOTE - CLINICAL SUMMARY MEDICAL DECISION MAKING FREE TEXT BOX
42-year-old male with history of recurrent diverticulitis, has had colonoscopy in the past but was before his first episode, here for assessment of left lower quadrant abdominal pain since Saturday.  Associated with abdominal bloating.  Patient is having regular bowel movements, no diarrhea.  No fever, chills, dysuria, hematuria.  No nausea or vomiting.  Patient has been able to take in a normal amount p.o.    Vital signs are normal.  The patient is well-appearing and in no distress.  He has clear lungs, regular rate and rhythm, soft abdomen with localized left lower quadrant tenderness to palpation.  He has no rebound or guarding.    Labs are without leukocytosis.  Patient has normal liver and renal function.    CT scan suggestive of uncomplicated diverticulitis.  Discussed results with the patient, offered trial of outpatient treatment with antibiotics, the patient prefers to attempt to treat with oral as opposed to going straight to IV antibiotics.    He received the first dose in the ED and was advised on the need to take oral antibiotics as prescribed.    Also, we discussed the need for him to get a colonoscopy as diverticulitis, especially recurrent diverticulitis in a young person could indicate underlying colon mass.

## 2023-03-23 NOTE — ED PROVIDER NOTE - OBJECTIVE STATEMENT
42-year-old male past medical history of diverticulitis presenting to the ED for evaluation left lower quadrant abdominal pain on Saturday.  Patient reporting intermittent, sharp left lower quadrant abdominal bloating, localized.  Describes pain similar to previous episode of diverticulitis.  Denies any fever, chills, nausea, vomiting, diarrhea, constipation, urinary symptoms.

## 2023-03-23 NOTE — ED ADULT NURSE NOTE - NSIMPLEMENTINTERV_GEN_ALL_ED
Implemented All Universal Safety Interventions:  [place] to call system. Call bell, personal items and telephone within reach. Instruct patient to call for assistance. Room bathroom lighting operational. Non-slip footwear when patient is off stretcher. Physically safe environment: no spills, clutter or unnecessary equipment. Stretcher in lowest position, wheels locked, appropriate side rails in place.

## 2023-03-23 NOTE — ED ADULT NURSE NOTE - PAIN RATING/NUMBER SCALE (0-10): ACTIVITY
The above referenced H&P by Dr. Mehdi Perez on 05/30/2017 was reviewed by Michelle Gonzalez MD on 6/14/2017, the patient was examined and no significant changes have occurred in the patient's condition since the H&P was performed.   Risks, benefits, alterna
5 (moderate pain)

## 2023-03-23 NOTE — ED PROVIDER NOTE - PATIENT PORTAL LINK FT
You can access the FollowMyHealth Patient Portal offered by [facility] by registering at the following website: [email or web address]. By joining [employer]’s FollowMyHealth portal, you will also be able to view your health information using other applications (apps) compatible with our system.

## 2023-03-23 NOTE — ED PROVIDER NOTE - PHYSICAL EXAMINATION
GENERAL: Well-nourished, Well-developed. NAD.  HEAD: No visible or palpable bumps or hematomas. No ecchymosis behind ears B/L.  Eyes: PERRLA, EOMI. No asymmetry. No nystagmus. No conjunctival injection. Non-icteric sclera.  ENMT: MMM.   Neck: Supple. FROM  CVS: Normal S1,S2. No murmurs appreciated on auscultation   RESP: No use of accessory muscles. Chest rise symmetrical with good expansion. Lungs clear to auscultation B/L. No wheezing, rales, or rhonchi auscultated.  GI: Normal auscultation of bowel sounds in all 4 quadrants. (+)ttp LLQ. Soft, Nondistended. No guarding or rebound tenderness. No CVAT B/L.  Skin: Warm, Dry. No rashes or lesions. Good cap refill < 2 sec B/L.

## 2023-03-24 ENCOUNTER — INPATIENT (INPATIENT)
Facility: HOSPITAL | Age: 43
LOS: 2 days | Discharge: HOME | DRG: 379 | End: 2023-03-27
Attending: INTERNAL MEDICINE | Admitting: INTERNAL MEDICINE
Payer: COMMERCIAL

## 2023-03-24 VITALS
RESPIRATION RATE: 18 BRPM | TEMPERATURE: 101 F | WEIGHT: 274.92 LBS | OXYGEN SATURATION: 99 % | DIASTOLIC BLOOD PRESSURE: 58 MMHG | HEART RATE: 98 BPM | SYSTOLIC BLOOD PRESSURE: 127 MMHG | HEIGHT: 73 IN

## 2023-03-24 DIAGNOSIS — K57.92 DIVERTICULITIS OF INTESTINE, PART UNSPECIFIED, WITHOUT PERFORATION OR ABSCESS WITHOUT BLEEDING: ICD-10-CM

## 2023-03-24 LAB
ALBUMIN SERPL ELPH-MCNC: 4 G/DL — SIGNIFICANT CHANGE UP (ref 3.5–5.2)
ALP SERPL-CCNC: 82 U/L — SIGNIFICANT CHANGE UP (ref 30–115)
ALT FLD-CCNC: 14 U/L — SIGNIFICANT CHANGE UP (ref 0–41)
ANION GAP SERPL CALC-SCNC: 9 MMOL/L — SIGNIFICANT CHANGE UP (ref 7–14)
AST SERPL-CCNC: 16 U/L — SIGNIFICANT CHANGE UP (ref 0–41)
BASOPHILS # BLD AUTO: 0.02 K/UL — SIGNIFICANT CHANGE UP (ref 0–0.2)
BASOPHILS NFR BLD AUTO: 0.2 % — SIGNIFICANT CHANGE UP (ref 0–1)
BILIRUB DIRECT SERPL-MCNC: <0.2 MG/DL — SIGNIFICANT CHANGE UP (ref 0–0.3)
BILIRUB INDIRECT FLD-MCNC: >0.5 MG/DL — SIGNIFICANT CHANGE UP (ref 0.2–1.2)
BILIRUB SERPL-MCNC: 0.7 MG/DL — SIGNIFICANT CHANGE UP (ref 0.2–1.2)
BUN SERPL-MCNC: 15 MG/DL — SIGNIFICANT CHANGE UP (ref 10–20)
CALCIUM SERPL-MCNC: 8.7 MG/DL — SIGNIFICANT CHANGE UP (ref 8.4–10.5)
CHLORIDE SERPL-SCNC: 103 MMOL/L — SIGNIFICANT CHANGE UP (ref 98–110)
CO2 SERPL-SCNC: 24 MMOL/L — SIGNIFICANT CHANGE UP (ref 17–32)
CREAT SERPL-MCNC: 0.8 MG/DL — SIGNIFICANT CHANGE UP (ref 0.7–1.5)
EGFR: 112 ML/MIN/1.73M2 — SIGNIFICANT CHANGE UP
EOSINOPHIL # BLD AUTO: 0.02 K/UL — SIGNIFICANT CHANGE UP (ref 0–0.7)
EOSINOPHIL NFR BLD AUTO: 0.2 % — SIGNIFICANT CHANGE UP (ref 0–8)
GLUCOSE SERPL-MCNC: 91 MG/DL — SIGNIFICANT CHANGE UP (ref 70–99)
HCT VFR BLD CALC: 40.9 % — LOW (ref 42–52)
HGB BLD-MCNC: 14.2 G/DL — SIGNIFICANT CHANGE UP (ref 14–18)
IMM GRANULOCYTES NFR BLD AUTO: 0.3 % — SIGNIFICANT CHANGE UP (ref 0.1–0.3)
LACTATE SERPL-SCNC: 0.7 MMOL/L — SIGNIFICANT CHANGE UP (ref 0.7–2)
LIDOCAIN IGE QN: 16 U/L — SIGNIFICANT CHANGE UP (ref 7–60)
LYMPHOCYTES # BLD AUTO: 0.9 K/UL — LOW (ref 1.2–3.4)
LYMPHOCYTES # BLD AUTO: 7.7 % — LOW (ref 20.5–51.1)
MCHC RBC-ENTMCNC: 31 PG — SIGNIFICANT CHANGE UP (ref 27–31)
MCHC RBC-ENTMCNC: 34.7 G/DL — SIGNIFICANT CHANGE UP (ref 32–37)
MCV RBC AUTO: 89.3 FL — SIGNIFICANT CHANGE UP (ref 80–94)
MONOCYTES # BLD AUTO: 0.93 K/UL — HIGH (ref 0.1–0.6)
MONOCYTES NFR BLD AUTO: 7.9 % — SIGNIFICANT CHANGE UP (ref 1.7–9.3)
NEUTROPHILS # BLD AUTO: 9.83 K/UL — HIGH (ref 1.4–6.5)
NEUTROPHILS NFR BLD AUTO: 83.7 % — HIGH (ref 42.2–75.2)
NRBC # BLD: 0 /100 WBCS — SIGNIFICANT CHANGE UP (ref 0–0)
PLATELET # BLD AUTO: 187 K/UL — SIGNIFICANT CHANGE UP (ref 130–400)
POTASSIUM SERPL-MCNC: 4.1 MMOL/L — SIGNIFICANT CHANGE UP (ref 3.5–5)
POTASSIUM SERPL-SCNC: 4.1 MMOL/L — SIGNIFICANT CHANGE UP (ref 3.5–5)
PROT SERPL-MCNC: 6.3 G/DL — SIGNIFICANT CHANGE UP (ref 6–8)
RBC # BLD: 4.58 M/UL — LOW (ref 4.7–6.1)
RBC # FLD: 12.1 % — SIGNIFICANT CHANGE UP (ref 11.5–14.5)
SARS-COV-2 RNA SPEC QL NAA+PROBE: SIGNIFICANT CHANGE UP
SODIUM SERPL-SCNC: 136 MMOL/L — SIGNIFICANT CHANGE UP (ref 135–146)
WBC # BLD: 11.74 K/UL — HIGH (ref 4.8–10.8)
WBC # FLD AUTO: 11.74 K/UL — HIGH (ref 4.8–10.8)

## 2023-03-24 PROCEDURE — 36415 COLL VENOUS BLD VENIPUNCTURE: CPT

## 2023-03-24 PROCEDURE — 99222 1ST HOSP IP/OBS MODERATE 55: CPT

## 2023-03-24 PROCEDURE — 85027 COMPLETE CBC AUTOMATED: CPT

## 2023-03-24 PROCEDURE — C9113: CPT

## 2023-03-24 PROCEDURE — 99285 EMERGENCY DEPT VISIT HI MDM: CPT

## 2023-03-24 PROCEDURE — 80053 COMPREHEN METABOLIC PANEL: CPT

## 2023-03-24 PROCEDURE — 80048 BASIC METABOLIC PNL TOTAL CA: CPT

## 2023-03-24 RX ORDER — ONDANSETRON 8 MG/1
4 TABLET, FILM COATED ORAL EVERY 8 HOURS
Refills: 0 | Status: DISCONTINUED | OUTPATIENT
Start: 2023-03-24 | End: 2023-03-27

## 2023-03-24 RX ORDER — PANTOPRAZOLE SODIUM 20 MG/1
40 TABLET, DELAYED RELEASE ORAL DAILY
Refills: 0 | Status: DISCONTINUED | OUTPATIENT
Start: 2023-03-24 | End: 2023-03-27

## 2023-03-24 RX ORDER — ACETAMINOPHEN 500 MG
650 TABLET ORAL ONCE
Refills: 0 | Status: COMPLETED | OUTPATIENT
Start: 2023-03-24 | End: 2023-03-24

## 2023-03-24 RX ORDER — CIPROFLOXACIN LACTATE 400MG/40ML
400 VIAL (ML) INTRAVENOUS EVERY 12 HOURS
Refills: 0 | Status: DISCONTINUED | OUTPATIENT
Start: 2023-03-24 | End: 2023-03-27

## 2023-03-24 RX ORDER — IBUPROFEN 200 MG
600 TABLET ORAL ONCE
Refills: 0 | Status: COMPLETED | OUTPATIENT
Start: 2023-03-24 | End: 2023-03-24

## 2023-03-24 RX ORDER — MORPHINE SULFATE 50 MG/1
4 CAPSULE, EXTENDED RELEASE ORAL EVERY 4 HOURS
Refills: 0 | Status: DISCONTINUED | OUTPATIENT
Start: 2023-03-24 | End: 2023-03-24

## 2023-03-24 RX ORDER — MORPHINE SULFATE 50 MG/1
4 CAPSULE, EXTENDED RELEASE ORAL ONCE
Refills: 0 | Status: DISCONTINUED | OUTPATIENT
Start: 2023-03-24 | End: 2023-03-24

## 2023-03-24 RX ORDER — SODIUM CHLORIDE 9 MG/ML
1000 INJECTION INTRAMUSCULAR; INTRAVENOUS; SUBCUTANEOUS
Refills: 0 | Status: DISCONTINUED | OUTPATIENT
Start: 2023-03-24 | End: 2023-03-27

## 2023-03-24 RX ORDER — PANTOPRAZOLE SODIUM 20 MG/1
40 TABLET, DELAYED RELEASE ORAL ONCE
Refills: 0 | Status: COMPLETED | OUTPATIENT
Start: 2023-03-24 | End: 2023-03-24

## 2023-03-24 RX ORDER — CIPROFLOXACIN LACTATE 400MG/40ML
400 VIAL (ML) INTRAVENOUS ONCE
Refills: 0 | Status: COMPLETED | OUTPATIENT
Start: 2023-03-24 | End: 2023-03-24

## 2023-03-24 RX ORDER — ONDANSETRON 8 MG/1
4 TABLET, FILM COATED ORAL ONCE
Refills: 0 | Status: COMPLETED | OUTPATIENT
Start: 2023-03-24 | End: 2023-03-24

## 2023-03-24 RX ORDER — SODIUM CHLORIDE 9 MG/ML
1000 INJECTION INTRAMUSCULAR; INTRAVENOUS; SUBCUTANEOUS ONCE
Refills: 0 | Status: COMPLETED | OUTPATIENT
Start: 2023-03-24 | End: 2023-03-24

## 2023-03-24 RX ORDER — METRONIDAZOLE 500 MG
500 TABLET ORAL ONCE
Refills: 0 | Status: COMPLETED | OUTPATIENT
Start: 2023-03-24 | End: 2023-03-24

## 2023-03-24 RX ORDER — ACETAMINOPHEN 500 MG
650 TABLET ORAL EVERY 6 HOURS
Refills: 0 | Status: DISCONTINUED | OUTPATIENT
Start: 2023-03-24 | End: 2023-03-27

## 2023-03-24 RX ORDER — METRONIDAZOLE 500 MG
500 TABLET ORAL EVERY 8 HOURS
Refills: 0 | Status: DISCONTINUED | OUTPATIENT
Start: 2023-03-24 | End: 2023-03-27

## 2023-03-24 RX ORDER — TEMAZEPAM 15 MG/1
15 CAPSULE ORAL AT BEDTIME
Refills: 0 | Status: DISCONTINUED | OUTPATIENT
Start: 2023-03-24 | End: 2023-03-27

## 2023-03-24 RX ORDER — MORPHINE SULFATE 50 MG/1
2 CAPSULE, EXTENDED RELEASE ORAL EVERY 4 HOURS
Refills: 0 | Status: DISCONTINUED | OUTPATIENT
Start: 2023-03-24 | End: 2023-03-27

## 2023-03-24 RX ADMIN — MORPHINE SULFATE 4 MILLIGRAM(S): 50 CAPSULE, EXTENDED RELEASE ORAL at 20:37

## 2023-03-24 RX ADMIN — ONDANSETRON 4 MILLIGRAM(S): 8 TABLET, FILM COATED ORAL at 16:36

## 2023-03-24 RX ADMIN — Medication 100 MILLIGRAM(S): at 23:00

## 2023-03-24 RX ADMIN — MORPHINE SULFATE 4 MILLIGRAM(S): 50 CAPSULE, EXTENDED RELEASE ORAL at 17:30

## 2023-03-24 RX ADMIN — MORPHINE SULFATE 4 MILLIGRAM(S): 50 CAPSULE, EXTENDED RELEASE ORAL at 15:34

## 2023-03-24 RX ADMIN — PANTOPRAZOLE SODIUM 40 MILLIGRAM(S): 20 TABLET, DELAYED RELEASE ORAL at 20:43

## 2023-03-24 RX ADMIN — SODIUM CHLORIDE 1000 MILLILITER(S): 9 INJECTION INTRAMUSCULAR; INTRAVENOUS; SUBCUTANEOUS at 15:32

## 2023-03-24 RX ADMIN — Medication 600 MILLIGRAM(S): at 17:30

## 2023-03-24 RX ADMIN — Medication 650 MILLIGRAM(S): at 15:32

## 2023-03-24 RX ADMIN — Medication 100 MILLIGRAM(S): at 17:31

## 2023-03-24 RX ADMIN — Medication 200 MILLIGRAM(S): at 17:30

## 2023-03-24 RX ADMIN — SODIUM CHLORIDE 100 MILLILITER(S): 9 INJECTION INTRAMUSCULAR; INTRAVENOUS; SUBCUTANEOUS at 20:38

## 2023-03-24 NOTE — H&P ADULT - HISTORY OF PRESENT ILLNESS
42-year-old male accompanied by his wife ( RN at [facility] ) Pt  with no significant past medical HX  presents for evaluation  cramping abdominal pain. Pt c/o of left lower quadrant abdominal pain  patient was seen in the emergency department yesterday. Pt  confirmed to have simple diverticulitis on CT scan started on p.o. antibiotics however patient returned home and has worsened today developing fever 101 and chills   patient he denies any other dysuria hesitancy or frequency .

## 2023-03-24 NOTE — ED PROVIDER NOTE - CLINICAL SUMMARY MEDICAL DECISION MAKING FREE TEXT BOX
patient given IV fluids IV antibiotics here in the emergency department we obtained labs patient found to be febrile with tenderness in the palpation to the left lower quadrant at this point I would consider patient to failure of outpatient therapy considering he had a CT scan yesterday confirming diverticulitis I do not feel a repeat CT less than 24 hours ago is indicated however I will admit patient for further evaluation consultation by GI we discussed case with patient and wife who agree with plan of care

## 2023-03-24 NOTE — H&P ADULT - ASSESSMENT
42-year-old male accompanied by his wife ( RN at [employer] ) Pt  with no significant past medical HX  presents for evaluation  cramping abdominal pain. Pt c/o of left lower quadrant abdominal pain  patient was seen in the emergency department yesterday. Pt  confirmed to have simple diverticulitis on CT scan started on p.o. antibiotics however patient returned home and has worsened today developing fever 101 and chills   patient he denies any other dysuria hesitancy or frequency .    DX: diverticulitis:    GI consult  NPO  IVF@ 100ml/h  ID consult  prn pain medications  prn Nausea /vomiting    Prophylaxis GI/VTE    case D/W Dr Palacio   42-year-old male accompanied by his wife ( RN at [facility] [employer] [facility] ) Pt  with no significant past medical HX  presents for evaluation  cramping abdominal pain. Pt c/o of left lower quadrant abdominal pain  patient was seen in the emergency department yesterday. Pt  confirmed to have simple diverticulitis on CT scan started on p.o. antibiotics however patient returned home and has worsened today developing fever 101 and chills   patient he denies any other dysuria hesitancy or frequency .    DX: diverticulitis:    GI consult pending  NPO  IVF@ 100ml/h  ID consult pending  prn pain medications  prn Nausea /vomiting  PPI    Prophylaxis GI/VTE    case D/W Dr Palacio

## 2023-03-24 NOTE — ED PROVIDER NOTE - CPE EDP CARDIAC NORM
"Chief Complaint   Patient presents with     Hypertension       Initial /61  Pulse 70  Temp 98.2  F (36.8  C) (Oral)  Wt 130 lb (59 kg)  SpO2 96%  BMI 23.78 kg/m2 Estimated body mass index is 23.78 kg/(m^2) as calculated from the following:    Height as of 7/28/17: 5' 2\" (1.575 m).    Weight as of this encounter: 130 lb (59 kg).  Medication Reconciliation: complete   Melonie Blancas MA      " normal...

## 2023-03-24 NOTE — ED PROVIDER NOTE - OBJECTIVE STATEMENT
patient.  Is a 42-year-old male presents for evaluation of left lower quadrant abdominal pain which is cramping in nature patient was seen in the emergency department 1 day prior confirmed to have simple diverticulitis on CT scan started on p.o. antibiotics however patient returned home and has worsened today developing fever and chills   patient he denies any other dysuria hesitancy or frequency denies any testicular pain denies any flank pain

## 2023-03-24 NOTE — H&P ADULT - NSHPPHYSICALEXAM_GEN_ALL_CORE
GENERAL:  41y/o Male NAD, resting comfortably.  HEAD:  Atraumatic, Normocephalic  EYES: EOMI, PERRLA, conjunctiva and sclera clear  NECK: Supple, No JVD, no cervical lymphadenopathy, non-tender  CHEST/LUNG: Clear to auscultation bilaterally; No wheeze, rhonchi, or rales  HEART: Regular rate and rhythm; S1&S2  ABDOMEN: Soft, mildly tender, Nondistended x 4 quadrants; Bowel sounds present  EXTREMITIES:   Peripheral Pulses Present, No clubbing, no cyanosis, or no edema, no calf tenderness  PSYCH: AAOx3, cooperative, appropriate  NEUROLOGY: WNL  SKIN: WNL

## 2023-03-24 NOTE — PATIENT PROFILE ADULT - FALL HARM RISK - UNIVERSAL INTERVENTIONS
Bed in lowest position, wheels locked, appropriate side rails in place/Call bell, personal items and telephone in reach/Instruct patient to call for assistance before getting out of bed or chair/Non-slip footwear when patient is out of bed/[place] to call system/Physically safe environment - no spills, clutter or unnecessary equipment/Purposeful Proactive Rounding/Room/bathroom lighting operational, light cord in reach

## 2023-03-24 NOTE — ED ADULT TRIAGE NOTE - CHIEF COMPLAINT QUOTE
Patient complaining of abdominal pain and fever. Patient seen in ED yesterday and diagnosed with diverticulitis

## 2023-03-24 NOTE — ED PROVIDER NOTE - ATTENDING APP SHARED VISIT CONTRIBUTION OF CARE
I have personally performed a history and physical exam on this patient and personally directed the management of the patient.  Is a 42-year-old male presents for evaluation of left lower quadrant abdominal pain which is cramping in nature patient was seen in the emergency department 1 day prior confirmed to have simple diverticulitis on CT scan started on p.o. antibiotics however patient returned home and has worsened today developing fever and chills   patient he denies any other dysuria hesitancy or frequency denies any testicular pain denies any flank pain    On physical exam patient is tender to palpation left lower quadrant no guarding no rebound normocephalic/atraumatic pupils equal round react light accommodation extraocular muscles intact oropharynx clear chest clear to auscultation bilaterally extremities full range of motion pedal pulse 2+ radial pulse 2+    Assessment plan patient given IV fluids IV antibiotics here in the emergency department we obtained labs patient found to be febrile with tenderness in the palpation to the left lower quadrant at this point I would consider patient to failure of outpatient therapy considering he had a CT scan yesterday confirming diverticulitis I do not feel a repeat CT less than 24 hours ago is indicated however I will admit patient for further evaluation consultation by GI we discussed case with patient and wife who agree with plan of care I have personally performed a history and physical exam on this patient and personally directed the management of the patient.  Is a 42-year-old male presents for evaluation of left lower quadrant abdominal pain which is cramping in nature patient was seen in the emergency department 1 day prior confirmed to have simple diverticulitis on CT scan started on p.o. antibiotics however patient returned home and has worsened today developing fever and chills   patient he denies any other dysuria hesitancy or frequency denies any testicular pain denies any flank pain.    On physical exam patient is tender to palpation left lower quadrant no guarding no rebound normocephalic/atraumatic pupils equal round react light accommodation extraocular muscles intact oropharynx clear chest clear to auscultation bilaterally extremities full range of motion pedal pulse 2+ radial pulse 2+    Assessment plan patient given IV fluids IV antibiotics here in the emergency department we obtained labs patient found to be febrile with tenderness in the palpation to the left lower quadrant at this point I would consider patient to failure of outpatient therapy considering he had a CT scan yesterday confirming diverticulitis I do not feel a repeat CT less than 24 hours ago is indicated however I will admit patient for further evaluation consultation by GI we discussed case with patient and wife who agree with plan of care

## 2023-03-24 NOTE — H&P ADULT - NSHPLABSRESULTS_GEN_ALL_CORE
Vital Signs Last 24 Hrs  T(C): 36.4 (24 Mar 2023 19:32), Max: 38.3 (24 Mar 2023 14:45)  T(F): 97.5 (24 Mar 2023 19:32), Max: 100.9 (24 Mar 2023 14:45)  HR: 92 (24 Mar 2023 19:32) (80 - 98)  BP: 119/64 (24 Mar 2023 19:32) (119/64 - 132/80)  BP(mean): --  RR: 18 (24 Mar 2023 19:32) (16 - 18)  SpO2: 100% (24 Mar 2023 19:32) (99% - 100%)    Parameters below as of 24 Mar 2023 19:32  Patient On (Oxygen Delivery Method): room air                          14.2   11.74 )-----------( 187      ( 24 Mar 2023 15:18 )             40.9       03-24    136  |  103  |  15  ----------------------------<  91  4.1   |  24  |  0.8    Ca    8.7      24 Mar 2023 15:18    TPro  6.3  /  Alb  4.0  /  TBili  0.7  /  DBili  <0.2  /  AST  16  /  ALT  14  /  AlkPhos  82                Urinalysis Basic - ( 23 Mar 2023 20:25 )    Color: Yellow / Appearance: Clear / S.025 / pH: x  Gluc: x / Ketone: Negative  / Bili: Negative / Urobili: 0.2 mg/dL   Blood: x / Protein: Negative mg/dL / Nitrite: Negative   Leuk Esterase: Negative / RBC: 3-5 /HPF / WBC Negative   Sq Epi: x / Non Sq Epi: Occasional /HPF / Bacteria: Few            Lactate Trend   @ 15:18 Lactate:0.7             CAPILLARY BLOOD GLUCOSE

## 2023-03-25 LAB
ANION GAP SERPL CALC-SCNC: 10 MMOL/L — SIGNIFICANT CHANGE UP (ref 7–14)
BUN SERPL-MCNC: 14 MG/DL — SIGNIFICANT CHANGE UP (ref 10–20)
CALCIUM SERPL-MCNC: 8.4 MG/DL — SIGNIFICANT CHANGE UP (ref 8.4–10.5)
CHLORIDE SERPL-SCNC: 106 MMOL/L — SIGNIFICANT CHANGE UP (ref 98–110)
CO2 SERPL-SCNC: 25 MMOL/L — SIGNIFICANT CHANGE UP (ref 17–32)
CREAT SERPL-MCNC: 0.8 MG/DL — SIGNIFICANT CHANGE UP (ref 0.7–1.5)
CULTURE RESULTS: NO GROWTH — SIGNIFICANT CHANGE UP
EGFR: 112 ML/MIN/1.73M2 — SIGNIFICANT CHANGE UP
GLUCOSE SERPL-MCNC: 84 MG/DL — SIGNIFICANT CHANGE UP (ref 70–99)
HCT VFR BLD CALC: 37.5 % — LOW (ref 42–52)
HGB BLD-MCNC: 12.8 G/DL — LOW (ref 14–18)
MCHC RBC-ENTMCNC: 31.1 PG — HIGH (ref 27–31)
MCHC RBC-ENTMCNC: 34.1 G/DL — SIGNIFICANT CHANGE UP (ref 32–37)
MCV RBC AUTO: 91 FL — SIGNIFICANT CHANGE UP (ref 80–94)
NRBC # BLD: 0 /100 WBCS — SIGNIFICANT CHANGE UP (ref 0–0)
PLATELET # BLD AUTO: 161 K/UL — SIGNIFICANT CHANGE UP (ref 130–400)
POTASSIUM SERPL-MCNC: 4.1 MMOL/L — SIGNIFICANT CHANGE UP (ref 3.5–5)
POTASSIUM SERPL-SCNC: 4.1 MMOL/L — SIGNIFICANT CHANGE UP (ref 3.5–5)
RBC # BLD: 4.12 M/UL — LOW (ref 4.7–6.1)
RBC # FLD: 12.2 % — SIGNIFICANT CHANGE UP (ref 11.5–14.5)
SODIUM SERPL-SCNC: 141 MMOL/L — SIGNIFICANT CHANGE UP (ref 135–146)
SPECIMEN SOURCE: SIGNIFICANT CHANGE UP
WBC # BLD: 10.11 K/UL — SIGNIFICANT CHANGE UP (ref 4.8–10.8)
WBC # FLD AUTO: 10.11 K/UL — SIGNIFICANT CHANGE UP (ref 4.8–10.8)

## 2023-03-25 PROCEDURE — 99232 SBSQ HOSP IP/OBS MODERATE 35: CPT

## 2023-03-25 RX ADMIN — MORPHINE SULFATE 2 MILLIGRAM(S): 50 CAPSULE, EXTENDED RELEASE ORAL at 05:00

## 2023-03-25 RX ADMIN — Medication 100 MILLIGRAM(S): at 15:11

## 2023-03-25 RX ADMIN — Medication 650 MILLIGRAM(S): at 10:40

## 2023-03-25 RX ADMIN — MORPHINE SULFATE 2 MILLIGRAM(S): 50 CAPSULE, EXTENDED RELEASE ORAL at 22:04

## 2023-03-25 RX ADMIN — Medication 100 MILLIGRAM(S): at 05:00

## 2023-03-25 RX ADMIN — MORPHINE SULFATE 2 MILLIGRAM(S): 50 CAPSULE, EXTENDED RELEASE ORAL at 21:14

## 2023-03-25 RX ADMIN — SODIUM CHLORIDE 100 MILLILITER(S): 9 INJECTION INTRAMUSCULAR; INTRAVENOUS; SUBCUTANEOUS at 22:04

## 2023-03-25 RX ADMIN — Medication 100 MILLIGRAM(S): at 21:14

## 2023-03-25 RX ADMIN — PANTOPRAZOLE SODIUM 40 MILLIGRAM(S): 20 TABLET, DELAYED RELEASE ORAL at 12:54

## 2023-03-25 RX ADMIN — Medication 200 MILLIGRAM(S): at 06:00

## 2023-03-25 RX ADMIN — MORPHINE SULFATE 2 MILLIGRAM(S): 50 CAPSULE, EXTENDED RELEASE ORAL at 04:10

## 2023-03-25 RX ADMIN — Medication 200 MILLIGRAM(S): at 17:27

## 2023-03-26 LAB
ALBUMIN SERPL ELPH-MCNC: 3.8 G/DL — SIGNIFICANT CHANGE UP (ref 3.5–5.2)
ALP SERPL-CCNC: 78 U/L — SIGNIFICANT CHANGE UP (ref 30–115)
ALT FLD-CCNC: 11 U/L — SIGNIFICANT CHANGE UP (ref 0–41)
ANION GAP SERPL CALC-SCNC: 7 MMOL/L — SIGNIFICANT CHANGE UP (ref 7–14)
AST SERPL-CCNC: 11 U/L — SIGNIFICANT CHANGE UP (ref 0–41)
BILIRUB SERPL-MCNC: 0.4 MG/DL — SIGNIFICANT CHANGE UP (ref 0.2–1.2)
BUN SERPL-MCNC: 9 MG/DL — LOW (ref 10–20)
CALCIUM SERPL-MCNC: 9.1 MG/DL — SIGNIFICANT CHANGE UP (ref 8.4–10.5)
CHLORIDE SERPL-SCNC: 103 MMOL/L — SIGNIFICANT CHANGE UP (ref 98–110)
CO2 SERPL-SCNC: 30 MMOL/L — SIGNIFICANT CHANGE UP (ref 17–32)
CREAT SERPL-MCNC: 0.9 MG/DL — SIGNIFICANT CHANGE UP (ref 0.7–1.5)
EGFR: 108 ML/MIN/1.73M2 — SIGNIFICANT CHANGE UP
GLUCOSE SERPL-MCNC: 92 MG/DL — SIGNIFICANT CHANGE UP (ref 70–99)
HCT VFR BLD CALC: 41 % — LOW (ref 42–52)
HGB BLD-MCNC: 13.9 G/DL — LOW (ref 14–18)
MCHC RBC-ENTMCNC: 31.2 PG — HIGH (ref 27–31)
MCHC RBC-ENTMCNC: 33.9 G/DL — SIGNIFICANT CHANGE UP (ref 32–37)
MCV RBC AUTO: 92.1 FL — SIGNIFICANT CHANGE UP (ref 80–94)
NRBC # BLD: 0 /100 WBCS — SIGNIFICANT CHANGE UP (ref 0–0)
PLATELET # BLD AUTO: 208 K/UL — SIGNIFICANT CHANGE UP (ref 130–400)
POTASSIUM SERPL-MCNC: 4.7 MMOL/L — SIGNIFICANT CHANGE UP (ref 3.5–5)
POTASSIUM SERPL-SCNC: 4.7 MMOL/L — SIGNIFICANT CHANGE UP (ref 3.5–5)
PROT SERPL-MCNC: 6.3 G/DL — SIGNIFICANT CHANGE UP (ref 6–8)
RBC # BLD: 4.45 M/UL — LOW (ref 4.7–6.1)
RBC # FLD: 11.9 % — SIGNIFICANT CHANGE UP (ref 11.5–14.5)
SODIUM SERPL-SCNC: 140 MMOL/L — SIGNIFICANT CHANGE UP (ref 135–146)
WBC # BLD: 6.67 K/UL — SIGNIFICANT CHANGE UP (ref 4.8–10.8)
WBC # FLD AUTO: 6.67 K/UL — SIGNIFICANT CHANGE UP (ref 4.8–10.8)

## 2023-03-26 PROCEDURE — 99223 1ST HOSP IP/OBS HIGH 75: CPT

## 2023-03-26 PROCEDURE — 99232 SBSQ HOSP IP/OBS MODERATE 35: CPT

## 2023-03-26 RX ORDER — MORPHINE SULFATE 50 MG/1
6 CAPSULE, EXTENDED RELEASE ORAL EVERY 6 HOURS
Refills: 0 | Status: DISCONTINUED | OUTPATIENT
Start: 2023-03-26 | End: 2023-03-27

## 2023-03-26 RX ORDER — IBUPROFEN 200 MG
400 TABLET ORAL ONCE
Refills: 0 | Status: DISCONTINUED | OUTPATIENT
Start: 2023-03-26 | End: 2023-03-27

## 2023-03-26 RX ADMIN — MORPHINE SULFATE 6 MILLIGRAM(S): 50 CAPSULE, EXTENDED RELEASE ORAL at 15:05

## 2023-03-26 RX ADMIN — Medication 100 MILLIGRAM(S): at 05:07

## 2023-03-26 RX ADMIN — PANTOPRAZOLE SODIUM 40 MILLIGRAM(S): 20 TABLET, DELAYED RELEASE ORAL at 14:11

## 2023-03-26 RX ADMIN — MORPHINE SULFATE 2 MILLIGRAM(S): 50 CAPSULE, EXTENDED RELEASE ORAL at 07:34

## 2023-03-26 RX ADMIN — Medication 100 MILLIGRAM(S): at 14:11

## 2023-03-26 RX ADMIN — Medication 100 MILLIGRAM(S): at 21:57

## 2023-03-26 RX ADMIN — Medication 200 MILLIGRAM(S): at 07:48

## 2023-03-26 NOTE — PROGRESS NOTE ADULT - ASSESSMENT
42-year-old male accompanied by his wife ( RN at [facility] ) Pt  with no significant past medical HX  presents for evaluation  cramping abdominal pain. Pt c/o of left lower quadrant abdominal pain  patient was seen in the emergency department yesterday. Pt  confirmed to have simple diverticulitis on CT scan started on p.o. antibiotics however patient returned home and has worsened today developing fever 101 and chills    #Acute sigmoid diverticulitis  -GI consult pending  -advanced to full liquid diet  -IVF@ 100ml/h  -ID eval appreciated  -c/w cipro and flagyl  -prn pain medications  -prn Nausea /vomiting  -PPI    Progress Note Handoff  Pending Consults: GI  Pending Tests: cultures  Pending Results: clinical improvement   Family Discussion: Patient  Disposition: Home___Xlikely dc in 1-2 days__/SNF______/Other_____/Unknown at this time_____  Spent over 50 min reviewing chart, speaking with patient/family and on coordinating patient care during interdisciplinary rounds 
42-year-old male accompanied by his wife ( RN at [facility] ) Pt  with no significant past medical HX  presents for evaluation  cramping abdominal pain. Pt c/o of left lower quadrant abdominal pain  patient was seen in the emergency department yesterday. Pt  confirmed to have simple diverticulitis on CT scan started on p.o. antibiotics however patient returned home and has worsened today developing fever 101 and chills    #Acute sigmoid diverticulitis  -GI consult pending  -advanced to CLD  -IVF@ 100ml/h  -ID eval appreciated  -c/w cipro and flagyl  -prn pain medications  -prn Nausea /vomiting  -PPI    Progress Note Handoff  Pending Consults: GI  Pending Tests: cultures  Pending Results: clinical improvement   Family Discussion: Patient  Disposition: Home___Xacute__/SNF______/Other_____/Unknown at this time_____  Spent over 50 min reviewing chart, speaking with patient/family and on coordinating patient care during interdisciplinary rounds

## 2023-03-26 NOTE — CONSULT NOTE ADULT - ASSESSMENT
Mild uncomplicated diverticulitis    recs:  Clears  advance diet as tolearted.  WIll see Dr Keyes for OP colonoscopyt
42-year-old male accompanied by his wife ( RN at [facility] ) Pt  with no significant past medical HX  presents for evaluation  cramping abdominal pain. Pt c/o of left lower quadrant abdominal pain  patient was seen in the emergency department yesterday. Pt  confirmed to have simple diverticulitis on CT scan started on p.o. antibiotics however patient returned home and has worsened today developing fever 101 and chills.    IMPRESSION/RECOMMENDATIONS  Acute sigmoid diverticulitis with no drainable collection on CT and minimal localised peritonitis on PE.'WBC11.7  -ciprofloxacin 400 mg iv q12h  -Flagyl 500 mg iv q8h

## 2023-03-26 NOTE — CONSULT NOTE ADULT - SUBJECTIVE AND OBJECTIVE BOX
KURTIS PUGH  42y, Male  Allergy: penicillin (Unknown)  sulfa drugs (Unknown)      All historical available data reviewed.    HPI:  42-year-old male accompanied by his wife ( RN at [facility] ) Pt  with no significant past medical HX  presents for evaluation  cramping abdominal pain. Pt c/o of left lower quadrant abdominal pain  patient was seen in the emergency department yesterday. Pt  confirmed to have simple diverticulitis on CT scan started on p.o. antibiotics however patient returned home and has worsened today developing fever 101 and chills   patient he denies any other dysuria hesitancy or frequency . (24 Mar 2023 19:31)    FAMILY HISTORY:  No pertinent family history in first degree relatives      PAST MEDICAL & SURGICAL HISTORY:  History of diverticulitis      No significant past surgical history            VITALS:  T(F): 98.2, Max: 100.9 (23 @ 14:45)  HR: 83  BP: 106/61  RR: 18Vital Signs Last 24 Hrs  T(C): 36.8 (24 Mar 2023 21:31), Max: 38.3 (24 Mar 2023 14:45)  T(F): 98.2 (24 Mar 2023 21:31), Max: 100.9 (24 Mar 2023 14:45)  HR: 83 (24 Mar 2023 21:31) (83 - 98)  BP: 106/61 (24 Mar 2023 21:31) (106/61 - 127/58)  BP(mean): --  RR: 18 (24 Mar 2023 21:31) (18 - 18)  SpO2: 100% (24 Mar 2023 19:32) (99% - 100%)    Parameters below as of 24 Mar 2023 19:32  Patient On (Oxygen Delivery Method): room air        TESTS & MEASUREMENTS:                        12.8   10.11 )-----------( 161      ( 25 Mar 2023 06:07 )             37.5         141  |  106  |  14  ----------------------------<  84  4.1   |  25  |  0.8    Ca    8.4      25 Mar 2023 06:07    TPro  6.3  /  Alb  4.0  /  TBili  0.7  /  DBili  <0.2  /  AST  16  /  ALT  14  /  AlkPhos  82      LIVER FUNCTIONS - ( 24 Mar 2023 15:18 )  Alb: 4.0 g/dL / Pro: 6.3 g/dL / ALK PHOS: 82 U/L / ALT: 14 U/L / AST: 16 U/L / GGT: x             Culture - Urine (collected 23 @ 20:25)  Source: Clean Catch Clean Catch (Midstream)  Final Report (23 @ 07:43):    No growth      Urinalysis Basic - ( 23 Mar 2023 20:25 )    Color: Yellow / Appearance: Clear / S.025 / pH: x  Gluc: x / Ketone: Negative  / Bili: Negative / Urobili: 0.2 mg/dL   Blood: x / Protein: Negative mg/dL / Nitrite: Negative   Leuk Esterase: Negative / RBC: 3-5 /HPF / WBC Negative   Sq Epi: x / Non Sq Epi: Occasional /HPF / Bacteria: Few          RADIOLOGY & ADDITIONAL TESTS:  Personal review of radiological diagnostics performed  Echo and EKG results noted when applicable.     MEDICATIONS:  acetaminophen     Tablet .. 650 milliGRAM(s) Oral every 6 hours PRN  aluminum hydroxide/magnesium hydroxide/simethicone Suspension 30 milliLiter(s) Oral every 4 hours PRN  ciprofloxacin   IVPB 400 milliGRAM(s) IV Intermittent every 12 hours  metroNIDAZOLE  IVPB 500 milliGRAM(s) IV Intermittent every 8 hours  morphine  - Injectable 4 milliGRAM(s) IV Push every 4 hours PRN  morphine  - Injectable 2 milliGRAM(s) IV Push every 4 hours PRN  ondansetron Injectable 4 milliGRAM(s) IV Push every 8 hours PRN  pantoprazole  Injectable 40 milliGRAM(s) IV Push daily  sodium chloride 0.9%. 1000 milliLiter(s) IV Continuous <Continuous>  temazepam 15 milliGRAM(s) Oral at bedtime PRN      ANTIBIOTICS:  ciprofloxacin   IVPB 400 milliGRAM(s) IV Intermittent every 12 hours  metroNIDAZOLE  IVPB 500 milliGRAM(s) IV Intermittent every 8 hours    
Gastroenterology/Hepatology Consultation    For questions and inquiries please page [phone].  For urgent matters or after 5pm and on weekends please page the fellow on call through the GI paging system.    42y Male with   Patient is a 42y old  Male who presents with a chief complaint of diverticulitis (26 Mar 2023 10:07)    HPI:  42-year-old male accompanied by his wife ( RN at [facility] ) Pt  with no significant past medical HX  presents for evaluation  cramping abdominal pain. Pt c/o of left lower quadrant abdominal pain  patient was seen in the emergency department yesterday. Pt  confirmed to have simple diverticulitis on CT scan started on p.o. antibiotics however patient returned home and has worsened today developing fever 101 and chills   patient he denies any other dysuria hesitancy or frequency . (24 Mar 2023 19:31)      GI Hx:    Previous colonoscopy(ies): ~ 2 years ago in     No pertinent family history in first degree relatives        Review of system  General:  (-) weight loss, (-) fevers  Eyes:  (-) visual changes  CV:  (-) chest pain  Resp: (-) SOB, (-) wheezing  GI: (-) abdominal pain,  (-) nausea, (-) vomiting, (-) dysphagia, (-) diarrhea, (-) constipation, (-) rectal bleeding, (-) melena, (-) hematemesis.  Neuro: (-) confusion, (-) weakness  Psych:  (-) Hallucinations  Heme:  (-) easy bruisability    Past medical/surgical Hx:  PAST MEDICAL & SURGICAL HISTORY:  History of diverticulitis      No significant past surgical history        Home Medications:      Allergies: penicillin (Unknown)  sulfa drugs (Unknown)      Current Medications:   acetaminophen     Tablet .. 650 milliGRAM(s) Oral every 6 hours PRN  aluminum hydroxide/magnesium hydroxide/simethicone Suspension 30 milliLiter(s) Oral every 4 hours PRN  ciprofloxacin   IVPB 400 milliGRAM(s) IV Intermittent every 12 hours  ibuprofen  Tablet. 400 milliGRAM(s) Oral once  metroNIDAZOLE  IVPB 500 milliGRAM(s) IV Intermittent every 8 hours  morphine  - Injectable 2 milliGRAM(s) IV Push every 4 hours PRN  morphine  - Injectable 6 milliGRAM(s) IV Push every 6 hours PRN  ondansetron Injectable 4 milliGRAM(s) IV Push every 8 hours PRN  pantoprazole  Injectable 40 milliGRAM(s) IV Push daily  sodium chloride 0.9%. 1000 milliLiter(s) IV Continuous <Continuous>  temazepam 15 milliGRAM(s) Oral at bedtime PRN      Physical exam:  T(C): 36.3 (03-26-23 @ 21:04), Max: 36.7 (03-26-23 @ 13:00)  HR: 72 (03-26-23 @ 21:04) (72 - 81)  BP: 145/84 (03-26-23 @ 21:04) (114/58 - 145/84)  RR: 18 (03-26-23 @ 21:04) (18 - 18)  SpO2: --  GENERAL: NAD  HEAD:  Atraumatic, Normocephalic  EYES: Sclera:NL  NECK: Supple, no JVD or thyromegaly  CHEST/LUNG: Good bilateral air entry  HEART: normal S1, S2. Regular  ABDOMEN: (-) distended, (-) tender, (-) rebound, (+) BS, (-)HSM  EXTREMITIES: (-) edema  NEUROLOGY: (-) asterixis  SKIN: (-) jaundice  IBIS: (-) melena (-) brbpr    Data:                        13.9   6.67  )-----------( 208      ( 26 Mar 2023 07:58 )             41.0     MCV 92.1 (03-26-23)    RDW 11.9 (03-26-23)    HGB trend:  13.9  03-26-23 @ 07:58  12.8  03-25-23 @ 06:07  14.2  03-24-23 @ 15:18        03-26    140  |  103  |  9<L>  ----------------------------<  92  4.7   |  30  |  0.9    Ca    9.1      26 Mar 2023 07:58    TPro  6.3  /  Alb  3.8  /  TBili  0.4  /  DBili  x   /  AST  11  /  ALT  11  /  AlkPhos  78  03-26    Liver panel trend:  TBili 0.4   /   AST 11   /   ALT 11   /   AlkP 78   /   Tptn 6.3   /   Alb 3.8    /   DBili --      03-26  TBili 0.7   /   AST 16   /   ALT 14   /   AlkP 82   /   Tptn 6.3   /   Alb 4.0    /   DBili <0.2      03-24  TBili 0.5   /   AST 18   /   ALT 20   /   AlkP 85   /   Tptn 6.7   /   Alb 4.2    /   DBili --      03-23            Culture - Blood (collected 24 Mar 2023 15:18)  Source: .Blood Blood  Preliminary Report (25 Mar 2023 23:01):    No growth to date.    Culture - Blood (collected 24 Mar 2023 15:18)  Source: .Blood Blood  Preliminary Report (25 Mar 2023 23:01):    No growth to date.

## 2023-03-27 VITALS
SYSTOLIC BLOOD PRESSURE: 99 MMHG | TEMPERATURE: 96 F | DIASTOLIC BLOOD PRESSURE: 61 MMHG | HEART RATE: 70 BPM | RESPIRATION RATE: 18 BRPM

## 2023-03-27 LAB
ALBUMIN SERPL ELPH-MCNC: 3.7 G/DL — SIGNIFICANT CHANGE UP (ref 3.5–5.2)
ALP SERPL-CCNC: 68 U/L — SIGNIFICANT CHANGE UP (ref 30–115)
ALT FLD-CCNC: 12 U/L — SIGNIFICANT CHANGE UP (ref 0–41)
ANION GAP SERPL CALC-SCNC: 9 MMOL/L — SIGNIFICANT CHANGE UP (ref 7–14)
AST SERPL-CCNC: 11 U/L — SIGNIFICANT CHANGE UP (ref 0–41)
BILIRUB SERPL-MCNC: 0.3 MG/DL — SIGNIFICANT CHANGE UP (ref 0.2–1.2)
BUN SERPL-MCNC: 9 MG/DL — LOW (ref 10–20)
CALCIUM SERPL-MCNC: 9.1 MG/DL — SIGNIFICANT CHANGE UP (ref 8.4–10.5)
CHLORIDE SERPL-SCNC: 104 MMOL/L — SIGNIFICANT CHANGE UP (ref 98–110)
CO2 SERPL-SCNC: 27 MMOL/L — SIGNIFICANT CHANGE UP (ref 17–32)
CREAT SERPL-MCNC: 0.8 MG/DL — SIGNIFICANT CHANGE UP (ref 0.7–1.5)
EGFR: 112 ML/MIN/1.73M2 — SIGNIFICANT CHANGE UP
GLUCOSE SERPL-MCNC: 90 MG/DL — SIGNIFICANT CHANGE UP (ref 70–99)
HCT VFR BLD CALC: 38.5 % — LOW (ref 42–52)
HGB BLD-MCNC: 13.3 G/DL — LOW (ref 14–18)
MCHC RBC-ENTMCNC: 31.2 PG — HIGH (ref 27–31)
MCHC RBC-ENTMCNC: 34.5 G/DL — SIGNIFICANT CHANGE UP (ref 32–37)
MCV RBC AUTO: 90.4 FL — SIGNIFICANT CHANGE UP (ref 80–94)
NRBC # BLD: 0 /100 WBCS — SIGNIFICANT CHANGE UP (ref 0–0)
PLATELET # BLD AUTO: 217 K/UL — SIGNIFICANT CHANGE UP (ref 130–400)
POTASSIUM SERPL-MCNC: 4.4 MMOL/L — SIGNIFICANT CHANGE UP (ref 3.5–5)
POTASSIUM SERPL-SCNC: 4.4 MMOL/L — SIGNIFICANT CHANGE UP (ref 3.5–5)
PROT SERPL-MCNC: 6.3 G/DL — SIGNIFICANT CHANGE UP (ref 6–8)
RBC # BLD: 4.26 M/UL — LOW (ref 4.7–6.1)
RBC # FLD: 11.8 % — SIGNIFICANT CHANGE UP (ref 11.5–14.5)
SODIUM SERPL-SCNC: 140 MMOL/L — SIGNIFICANT CHANGE UP (ref 135–146)
WBC # BLD: 4.71 K/UL — LOW (ref 4.8–10.8)
WBC # FLD AUTO: 4.71 K/UL — LOW (ref 4.8–10.8)

## 2023-03-27 PROCEDURE — 99239 HOSP IP/OBS DSCHRG MGMT >30: CPT

## 2023-03-27 RX ADMIN — ONDANSETRON 4 MILLIGRAM(S): 8 TABLET, FILM COATED ORAL at 01:16

## 2023-03-27 RX ADMIN — Medication 200 MILLIGRAM(S): at 05:38

## 2023-03-27 RX ADMIN — MORPHINE SULFATE 6 MILLIGRAM(S): 50 CAPSULE, EXTENDED RELEASE ORAL at 01:16

## 2023-03-27 RX ADMIN — MORPHINE SULFATE 6 MILLIGRAM(S): 50 CAPSULE, EXTENDED RELEASE ORAL at 02:15

## 2023-03-27 RX ADMIN — SODIUM CHLORIDE 100 MILLILITER(S): 9 INJECTION INTRAMUSCULAR; INTRAVENOUS; SUBCUTANEOUS at 01:16

## 2023-03-27 RX ADMIN — Medication 100 MILLIGRAM(S): at 05:02

## 2023-03-27 NOTE — DISCHARGE NOTE PROVIDER - PROVIDER TOKENS
PROVIDER:[TOKEN:[[id number]],FOLLOWUP:[1 week]],PROVIDER:[TOKEN:[[id number]],SCHEDULEDAPPT:[05/12/2023]]

## 2023-03-27 NOTE — DISCHARGE NOTE NURSING/CASE MANAGEMENT/SOCIAL WORK - PATIENT PORTAL LINK FT
You can access the FollowMyHealth Patient Portal offered by [facility] by registering at the following website: [email or web address]. By joining [employer]’s FollowMyHealth portal, you will also be able to view your health information using other applications (apps) compatible with our system.

## 2023-03-27 NOTE — DISCHARGE NOTE PROVIDER - HOSPITAL COURSE
42-year-old male accompanied by his wife ( RN at [facility] ) Pt  with no significant past medical HX  presents for evaluation  cramping abdominal pain. Pt c/o of left lower quadrant abdominal pain  patient was seen in the emergency department yesterday. Pt  confirmed to have simple diverticulitis on CT scan started on p.o. antibiotics however patient returned home and has worsened today developing fever 101 and chills. Patient was admitted for failed PO antibiotics for Acute sigmoid diverticulitis. He was treated with IVF, pain control, slow advancement of diet and IV antibiotics. GI evaluated him. At this time he is clinically improved and medically stable for a hospital discharge.    Things to follow up:  -PCP follow up in 1-2 weeks  -Take antibiotics as prescribed for 11 more days  -Follow up with Dr. Keyes on May 12th for an outpatient colonoscopy    Patient was seen and examined at bedside; patient denies any new complaints; reports improvement in his abd pain. Discharge planning was discussed.  Vital Signs Last 24 Hrs  T(C): 35.6 (27 Mar 2023 05:35), Max: 36.7 (26 Mar 2023 13:00)  T(F): 96 (27 Mar 2023 05:35), Max: 98 (26 Mar 2023 13:00)  HR: 70 (27 Mar 2023 05:35) (70 - 81)  BP: 99/61 (27 Mar 2023 05:35) (99/61 - 145/84)  BP(mean): --  RR: 18 (27 Mar 2023 05:35) (18 - 18)  SpO2: --  GENERAL: NAD, well-groomed, well-developed  HEAD:  Atraumatic, Normocephalic  EYES: EOMI, PERRLA, conjunctiva and sclera clear  ENMT: No tonsillar erythema, exudates, or enlargement; Moist mucous membranes  NECK: Supple, No JVD, Normal thyroid  NERVOUS SYSTEM:  Alert & Oriented X3, Good concentration; Motor Strength 5/5 B/L upper and lower extremities  CHEST/LUNG: Clear to percussion bilaterally; No rales, rhonchi, wheezing, or rubs  HEART: Regular rate and rhythm; No murmurs, rubs, or gallops  ABDOMEN: +mild left lower quadrant tenderness, no rebound  EXTREMITIES:  2+ Peripheral Pulses, No clubbing, cyanosis, or edema

## 2023-03-27 NOTE — DISCHARGE NOTE PROVIDER - NSDCMRMEDTOKEN_GEN_ALL_CORE_FT
Cipro 500 mg oral tablet: 1 tab(s) orally 2 times a day  metroNIDAZOLE 500 mg oral tablet: 1 tab(s) orally 3 times a day

## 2023-03-27 NOTE — DISCHARGE NOTE PROVIDER - CARE PROVIDER_API CALL
Kiel Carrillo ()  Infectious Disease; Internal Medicine  [address] [place] [address]  Phone: [phone]  Fax: [phone]  Follow Up Time: 1 week    Gilmer Keyes)  Gastroenterology  [address] [place] [address]  Phone: [phone]  Fax: [phone]  Scheduled Appointment: 05/12/2023

## 2023-03-27 NOTE — DISCHARGE NOTE PROVIDER - NSDCFUADDINST_GEN_ALL_CORE_FT
Things to follow up:  -PCP follow up in 1-2 weeks  -Take antibiotics as prescribed for 11 more days  -Follow up with Dr. Keyes on May 12th for an outpatient colonoscopy

## 2023-03-29 LAB
CULTURE RESULTS: SIGNIFICANT CHANGE UP
CULTURE RESULTS: SIGNIFICANT CHANGE UP
SPECIMEN SOURCE: SIGNIFICANT CHANGE UP
SPECIMEN SOURCE: SIGNIFICANT CHANGE UP

## 2023-04-09 DIAGNOSIS — K57.33 DIVERTICULITIS OF LARGE INTESTINE WITHOUT PERFORATION OR ABSCESS WITH BLEEDING: ICD-10-CM

## 2023-04-09 DIAGNOSIS — Z88.0 ALLERGY STATUS TO PENICILLIN: ICD-10-CM

## 2023-04-09 DIAGNOSIS — Z88.2 ALLERGY STATUS TO SULFONAMIDES: ICD-10-CM

## 2023-04-09 DIAGNOSIS — Z20.822 CONTACT WITH AND (SUSPECTED) EXPOSURE TO COVID-19: ICD-10-CM

## 2024-03-19 NOTE — DISCHARGE NOTE PROVIDER - NSDCCPCAREPLAN_GEN_ALL_CORE_FT
Admission Reconciliation is Not Complete  Discharge Reconciliation is Not Complete PRINCIPAL DISCHARGE DIAGNOSIS  Diagnosis: Diverticulitis  Assessment and Plan of Treatment: Pt  confirmed to have simple diverticulitis on CT scan started on p.o. antibiotics however patient returned home and has worsened today developing fever 101 and chills. Patient was admitted for failed PO antibiotics for Acute sigmoid diverticulitis. He was treated with IVF, pain control, slow advancement of diet and IV antibiotics. GI evaluated him. At this time he is clinically improved and medically stable for a hospital discharge.  Things to follow up:  -PCP follow up in 1-2 weeks  -Take antibiotics as prescribed for 11 more days  -Follow up with Dr. Keyes on May 12th for an outpatient colonoscopy     Admission Reconciliation is Not Complete  Discharge Reconciliation is Completed

## 2025-09-16 ENCOUNTER — EMERGENCY (EMERGENCY)
Facility: HOSPITAL | Age: 45
LOS: 0 days | Discharge: HOME | End: 2025-09-16
Attending: EMERGENCY MEDICINE
Payer: COMMERCIAL

## 2025-09-16 VITALS
RESPIRATION RATE: 18 BRPM | DIASTOLIC BLOOD PRESSURE: 82 MMHG | TEMPERATURE: 98 F | HEART RATE: 99 BPM | SYSTOLIC BLOOD PRESSURE: 129 MMHG | OXYGEN SATURATION: 100 %

## 2025-09-16 DIAGNOSIS — M25.562 PAIN IN LEFT KNEE: ICD-10-CM

## 2025-09-16 DIAGNOSIS — M54.50 LOW BACK PAIN, UNSPECIFIED: ICD-10-CM

## 2025-09-16 DIAGNOSIS — Z88.0 ALLERGY STATUS TO PENICILLIN: ICD-10-CM

## 2025-09-16 DIAGNOSIS — M54.2 CERVICALGIA: ICD-10-CM
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
## 2025-09-16 DIAGNOSIS — Z88.2 ALLERGY STATUS TO SULFONAMIDES: ICD-10-CM
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
## 2025-09-16 PROCEDURE — 99284 EMERGENCY DEPT VISIT MOD MDM: CPT

## 2025-09-16 PROCEDURE — 99283 EMERGENCY DEPT VISIT LOW MDM: CPT | Mod: 25

## 2025-09-16 PROCEDURE — 73564 X-RAY EXAM KNEE 4 OR MORE: CPT | Mod: LT

## 2025-09-16 PROCEDURE — 73564 X-RAY EXAM KNEE 4 OR MORE: CPT | Mod: 26,LT

## 2025-09-16 RX ORDER — IBUPROFEN 200 MG
600 TABLET ORAL ONCE
Refills: 0 | Status: COMPLETED | OUTPATIENT
Start: 2025-09-16 | End: 2025-09-16

## 2025-09-16 RX ORDER — METHOCARBAMOL 500 MG/1
1000 TABLET, FILM COATED ORAL ONCE
Refills: 0 | Status: COMPLETED | OUTPATIENT
Start: 2025-09-16 | End: 2025-09-16

## 2025-09-16 RX ADMIN — METHOCARBAMOL 1000 MILLIGRAM(S): 500 TABLET, FILM COATED ORAL at 16:59

## 2025-09-16 RX ADMIN — Medication 600 MILLIGRAM(S): at 16:59

## 2025-09-17 PROBLEM — Z87.19 PERSONAL HISTORY OF OTHER DISEASES OF THE DIGESTIVE SYSTEM: Chronic | Status: ACTIVE | Noted: 2023-03-23
